# Patient Record
Sex: FEMALE | Race: WHITE | NOT HISPANIC OR LATINO | Employment: OTHER | URBAN - METROPOLITAN AREA
[De-identification: names, ages, dates, MRNs, and addresses within clinical notes are randomized per-mention and may not be internally consistent; named-entity substitution may affect disease eponyms.]

---

## 2017-11-13 ENCOUNTER — CONVERSION ENCOUNTER (OUTPATIENT)
Dept: GENERAL RADIOLOGY | Facility: HOSPITAL | Age: 67
End: 2017-11-13

## 2018-11-15 ENCOUNTER — CONVERSION ENCOUNTER (OUTPATIENT)
Dept: GENERAL RADIOLOGY | Facility: HOSPITAL | Age: 68
End: 2018-11-15

## 2018-12-05 ENCOUNTER — OFFICE VISIT CONVERTED (OUTPATIENT)
Dept: SURGERY | Facility: CLINIC | Age: 68
End: 2018-12-05
Attending: NURSE PRACTITIONER

## 2019-03-13 ENCOUNTER — HOSPITAL ENCOUNTER (OUTPATIENT)
Dept: OTHER | Facility: HOSPITAL | Age: 69
Discharge: HOME | End: 2019-03-13
Attending: NURSE PRACTITIONER

## 2019-03-13 LAB
ALBUMIN SERPL-MCNC: 4.3 G/DL (ref 3.5–5)
ALBUMIN/GLOB SERPL: 1.2 {RATIO} (ref 1.4–2.6)
ALP SERPL-CCNC: 75 U/L (ref 43–160)
ALT SERPL-CCNC: 15 U/L (ref 10–40)
AMYLASE SERPL-CCNC: 284 U/L (ref 30–110)
ANION GAP SERPL CALC-SCNC: 16 MMOL/L (ref 8–19)
AST SERPL-CCNC: 19 U/L (ref 15–50)
BASOPHILS # BLD AUTO: 0.02 10*3/UL (ref 0–0.2)
BASOPHILS NFR BLD AUTO: 0.3 % (ref 0–3)
BILIRUB SERPL-MCNC: 0.36 MG/DL (ref 0.2–1.3)
BUN SERPL-MCNC: 17 MG/DL (ref 5–25)
BUN/CREAT SERPL: 17 {RATIO} (ref 6–20)
CALCIUM SERPL-MCNC: 10.6 MG/DL (ref 8.7–10.4)
CHLORIDE SERPL-SCNC: 99 MMOL/L (ref 99–111)
CONV ABS IMM GRAN: 0.02 10*3/UL (ref 0–0.2)
CONV CO2: 27 MMOL/L (ref 22–32)
CONV IMMATURE GRAN: 0.3 % (ref 0–1.8)
CONV TOTAL PROTEIN: 7.9 G/DL (ref 6.3–8.2)
CREAT UR-MCNC: 1.01 MG/DL (ref 0.5–0.9)
DEPRECATED RDW RBC AUTO: 45.7 FL (ref 36.4–46.3)
EOSINOPHIL # BLD AUTO: 0.28 10*3/UL (ref 0–0.7)
EOSINOPHIL # BLD AUTO: 3.9 % (ref 0–7)
ERYTHROCYTE [DISTWIDTH] IN BLOOD BY AUTOMATED COUNT: 13 % (ref 11.7–14.4)
GFR SERPLBLD BASED ON 1.73 SQ M-ARVRAT: 58 ML/MIN/{1.73_M2}
GLOBULIN UR ELPH-MCNC: 3.6 G/DL (ref 2–3.5)
GLUCOSE SERPL-MCNC: 101 MG/DL (ref 65–99)
HBA1C MFR BLD: 13.5 G/DL (ref 12–16)
HCT VFR BLD AUTO: 39.9 % (ref 37–47)
LIPASE SERPL-CCNC: 16 U/L (ref 5–51)
LYMPHOCYTES # BLD AUTO: 2.97 10*3/UL (ref 1–5)
MCH RBC QN AUTO: 32.5 PG (ref 27–31)
MCHC RBC AUTO-ENTMCNC: 33.8 G/DL (ref 33–37)
MCV RBC AUTO: 95.9 FL (ref 81–99)
MONOCYTES # BLD AUTO: 0.95 10*3/UL (ref 0.2–1.2)
MONOCYTES NFR BLD AUTO: 13.1 % (ref 3–10)
NEUTROPHILS # BLD AUTO: 3.01 10*3/UL (ref 2–8)
NEUTROPHILS NFR BLD AUTO: 41.4 % (ref 30–85)
NRBC CBCN: 0 % (ref 0–0.7)
OSMOLALITY SERPL CALC.SUM OF ELEC: 288 MOSM/KG (ref 273–304)
PLATELET # BLD AUTO: 185 10*3/UL (ref 130–400)
PMV BLD AUTO: 12.1 FL (ref 9.4–12.3)
POTASSIUM SERPL-SCNC: 3.8 MMOL/L (ref 3.5–5.3)
RBC # BLD AUTO: 4.16 10*6/UL (ref 4.2–5.4)
SODIUM SERPL-SCNC: 138 MMOL/L (ref 135–147)
T4 FREE SERPL-MCNC: 1.2 NG/DL (ref 0.9–1.8)
TSH SERPL-ACNC: 1.25 M[IU]/L (ref 0.27–4.2)
VARIANT LYMPHS NFR BLD MANUAL: 41 % (ref 20–45)
WBC # BLD AUTO: 7.25 10*3/UL (ref 4.8–10.8)

## 2019-03-22 ENCOUNTER — HOSPITAL ENCOUNTER (OUTPATIENT)
Dept: GENERAL RADIOLOGY | Facility: HOSPITAL | Age: 69
Discharge: HOME | End: 2019-03-22
Attending: NURSE PRACTITIONER

## 2019-03-25 ENCOUNTER — HOSPITAL ENCOUNTER (OUTPATIENT)
Dept: OTHER | Facility: HOSPITAL | Age: 69
Discharge: HOME | End: 2019-03-25
Attending: NURSE PRACTITIONER

## 2019-03-25 LAB
CHOLEST SERPL-MCNC: 206 MG/DL (ref 107–200)
CHOLEST/HDLC SERPL: 3.4 {RATIO} (ref 3–6)
HDLC SERPL-MCNC: 60 MG/DL (ref 40–60)
LDLC SERPL CALC-MCNC: 115 MG/DL (ref 70–100)
TRIGL SERPL-MCNC: 156 MG/DL (ref 40–150)
VLDLC SERPL-MCNC: 31 MG/DL (ref 5–37)

## 2019-03-28 ENCOUNTER — HOSPITAL ENCOUNTER (OUTPATIENT)
Dept: OTHER | Facility: HOSPITAL | Age: 69
Discharge: HOME | End: 2019-03-28
Attending: NURSE PRACTITIONER

## 2019-03-28 LAB
AMYLASE SERPL-CCNC: 221 U/L (ref 30–110)
PTH-INTACT SERPL-MCNC: 49.4 PG/ML (ref 11.1–79.5)

## 2019-03-29 LAB — CONV CALCIUM IONIZED: 5.9 MG/DL (ref 4.5–5.6)

## 2019-04-08 ENCOUNTER — HOSPITAL ENCOUNTER (OUTPATIENT)
Dept: GENERAL RADIOLOGY | Facility: HOSPITAL | Age: 69
Discharge: HOME | End: 2019-04-08
Attending: NURSE PRACTITIONER

## 2019-04-19 ENCOUNTER — HOSPITAL ENCOUNTER (OUTPATIENT)
Dept: OTHER | Facility: HOSPITAL | Age: 69
Discharge: HOME | End: 2019-04-19
Attending: NURSE PRACTITIONER

## 2019-04-19 LAB
AMYLASE SERPL-CCNC: 473 U/L (ref 30–110)
LIPASE SERPL-CCNC: 21 U/L (ref 5–51)

## 2019-04-20 LAB — CONV CALCIUM IONIZED: 5.6 MG/DL (ref 4.5–5.6)

## 2019-05-14 ENCOUNTER — OFFICE VISIT CONVERTED (OUTPATIENT)
Dept: GASTROENTEROLOGY | Facility: CLINIC | Age: 69
End: 2019-05-14
Attending: NURSE PRACTITIONER

## 2019-05-14 ENCOUNTER — CONVERSION ENCOUNTER (OUTPATIENT)
Dept: GASTROENTEROLOGY | Facility: CLINIC | Age: 69
End: 2019-05-14

## 2019-06-06 ENCOUNTER — HOSPITAL ENCOUNTER (OUTPATIENT)
Dept: GASTROENTEROLOGY | Facility: HOSPITAL | Age: 69
Setting detail: HOSPITAL OUTPATIENT SURGERY
Discharge: HOME | End: 2019-06-06
Attending: INTERNAL MEDICINE

## 2019-08-01 ENCOUNTER — OFFICE VISIT CONVERTED (OUTPATIENT)
Dept: GASTROENTEROLOGY | Facility: CLINIC | Age: 69
End: 2019-08-01
Attending: NURSE PRACTITIONER

## 2019-08-30 ENCOUNTER — HOSPITAL ENCOUNTER (OUTPATIENT)
Dept: GASTROENTEROLOGY | Facility: HOSPITAL | Age: 69
Discharge: HOME | End: 2019-08-30
Attending: NURSE PRACTITIONER

## 2019-10-02 ENCOUNTER — HOSPITAL ENCOUNTER (OUTPATIENT)
Dept: OTHER | Facility: HOSPITAL | Age: 69
Discharge: HOME | End: 2019-10-02
Attending: NURSE PRACTITIONER

## 2019-10-02 LAB
ALBUMIN SERPL-MCNC: 4.4 G/DL (ref 3.5–5)
ALBUMIN/GLOB SERPL: 1.2 {RATIO} (ref 1.4–2.6)
ALP SERPL-CCNC: 89 U/L (ref 43–160)
ALT SERPL-CCNC: 24 U/L (ref 10–40)
ANION GAP SERPL CALC-SCNC: 19 MMOL/L (ref 8–19)
AST SERPL-CCNC: 26 U/L (ref 15–50)
BILIRUB SERPL-MCNC: 0.35 MG/DL (ref 0.2–1.3)
BUN SERPL-MCNC: 19 MG/DL (ref 5–25)
BUN/CREAT SERPL: 19 {RATIO} (ref 6–20)
CALCIUM SERPL-MCNC: 10.6 MG/DL (ref 8.7–10.4)
CHLORIDE SERPL-SCNC: 97 MMOL/L (ref 99–111)
CHOLEST SERPL-MCNC: 247 MG/DL (ref 107–200)
CHOLEST/HDLC SERPL: 4.1 {RATIO} (ref 3–6)
CONV CO2: 25 MMOL/L (ref 22–32)
CONV TOTAL PROTEIN: 8 G/DL (ref 6.3–8.2)
CREAT UR-MCNC: 1.02 MG/DL (ref 0.5–0.9)
GFR SERPLBLD BASED ON 1.73 SQ M-ARVRAT: 57 ML/MIN/{1.73_M2}
GLOBULIN UR ELPH-MCNC: 3.6 G/DL (ref 2–3.5)
GLUCOSE SERPL-MCNC: 109 MG/DL (ref 65–99)
HDLC SERPL-MCNC: 60 MG/DL (ref 40–60)
LDLC SERPL CALC-MCNC: 149 MG/DL (ref 70–100)
OSMOLALITY SERPL CALC.SUM OF ELEC: 287 MOSM/KG (ref 273–304)
POTASSIUM SERPL-SCNC: 3.8 MMOL/L (ref 3.5–5.3)
SODIUM SERPL-SCNC: 137 MMOL/L (ref 135–147)
T4 FREE SERPL-MCNC: 1.3 NG/DL (ref 0.9–1.8)
TRIGL SERPL-MCNC: 191 MG/DL (ref 40–150)
TSH SERPL-ACNC: 1.42 M[IU]/L (ref 0.27–4.2)
VLDLC SERPL-MCNC: 38 MG/DL (ref 5–37)

## 2019-10-22 ENCOUNTER — HOSPITAL ENCOUNTER (OUTPATIENT)
Dept: OTHER | Facility: HOSPITAL | Age: 69
Discharge: HOME | End: 2019-10-22
Attending: NURSE PRACTITIONER

## 2019-10-22 LAB
EST. AVERAGE GLUCOSE BLD GHB EST-MCNC: 120 MG/DL
HBA1C MFR BLD: 5.8 % (ref 3.5–5.7)

## 2019-11-14 ENCOUNTER — HOSPITAL ENCOUNTER (OUTPATIENT)
Dept: GENERAL RADIOLOGY | Facility: HOSPITAL | Age: 69
Discharge: HOME | End: 2019-11-14
Attending: PHYSICIAN ASSISTANT

## 2019-11-14 LAB
CREAT BLD-MCNC: 0.9 MG/DL (ref 0.6–1.4)
GFR SERPLBLD BASED ON 1.73 SQ M-ARVRAT: >60 ML/MIN/{1.73_M2}

## 2019-12-31 ENCOUNTER — OFFICE VISIT CONVERTED (OUTPATIENT)
Dept: NEUROSURGERY | Facility: CLINIC | Age: 69
End: 2019-12-31
Attending: PHYSICIAN ASSISTANT

## 2019-12-31 ENCOUNTER — CONVERSION ENCOUNTER (OUTPATIENT)
Dept: NEUROLOGY | Facility: CLINIC | Age: 69
End: 2019-12-31

## 2020-01-08 ENCOUNTER — HOSPITAL ENCOUNTER (OUTPATIENT)
Dept: GENERAL RADIOLOGY | Facility: HOSPITAL | Age: 70
Discharge: HOME | End: 2020-01-08
Attending: PHYSICIAN ASSISTANT

## 2020-01-15 ENCOUNTER — OFFICE VISIT CONVERTED (OUTPATIENT)
Dept: NEUROSURGERY | Facility: CLINIC | Age: 70
End: 2020-01-15
Attending: PHYSICIAN ASSISTANT

## 2020-08-31 ENCOUNTER — CONVERSION ENCOUNTER (OUTPATIENT)
Dept: PODIATRY | Facility: CLINIC | Age: 70
End: 2020-08-31

## 2020-08-31 ENCOUNTER — OFFICE VISIT CONVERTED (OUTPATIENT)
Dept: PODIATRY | Facility: CLINIC | Age: 70
End: 2020-08-31
Attending: PODIATRIST

## 2020-09-09 ENCOUNTER — PROCEDURE VISIT CONVERTED (OUTPATIENT)
Dept: PODIATRY | Facility: CLINIC | Age: 70
End: 2020-09-09
Attending: PODIATRIST

## 2020-09-09 ENCOUNTER — CONVERSION ENCOUNTER (OUTPATIENT)
Dept: PODIATRY | Facility: CLINIC | Age: 70
End: 2020-09-09

## 2020-09-23 ENCOUNTER — OFFICE VISIT CONVERTED (OUTPATIENT)
Dept: PODIATRY | Facility: CLINIC | Age: 70
End: 2020-09-23
Attending: PODIATRIST

## 2021-04-01 ENCOUNTER — CONVERSION ENCOUNTER (OUTPATIENT)
Dept: FAMILY MEDICINE CLINIC | Facility: CLINIC | Age: 71
End: 2021-04-01

## 2021-04-01 ENCOUNTER — OFFICE VISIT CONVERTED (OUTPATIENT)
Dept: FAMILY MEDICINE CLINIC | Facility: CLINIC | Age: 71
End: 2021-04-01
Attending: FAMILY MEDICINE

## 2021-04-06 ENCOUNTER — HOSPITAL ENCOUNTER (OUTPATIENT)
Dept: FAMILY MEDICINE CLINIC | Facility: CLINIC | Age: 71
Discharge: HOME | End: 2021-04-06
Attending: FAMILY MEDICINE

## 2021-05-14 VITALS
HEIGHT: 58 IN | HEART RATE: 67 BPM | WEIGHT: 131 LBS | OXYGEN SATURATION: 99 % | TEMPERATURE: 97.1 F | SYSTOLIC BLOOD PRESSURE: 153 MMHG | DIASTOLIC BLOOD PRESSURE: 70 MMHG | BODY MASS INDEX: 27.5 KG/M2

## 2021-05-14 VITALS
OXYGEN SATURATION: 97 % | TEMPERATURE: 96.7 F | HEIGHT: 58 IN | SYSTOLIC BLOOD PRESSURE: 145 MMHG | BODY MASS INDEX: 27.71 KG/M2 | HEART RATE: 71 BPM | WEIGHT: 132 LBS | DIASTOLIC BLOOD PRESSURE: 111 MMHG

## 2021-05-14 VITALS
TEMPERATURE: 97.5 F | OXYGEN SATURATION: 100 % | HEIGHT: 58 IN | HEART RATE: 66 BPM | SYSTOLIC BLOOD PRESSURE: 162 MMHG | DIASTOLIC BLOOD PRESSURE: 75 MMHG | WEIGHT: 137 LBS | BODY MASS INDEX: 28.76 KG/M2

## 2021-05-14 VITALS
WEIGHT: 132 LBS | SYSTOLIC BLOOD PRESSURE: 166 MMHG | OXYGEN SATURATION: 98 % | DIASTOLIC BLOOD PRESSURE: 129 MMHG | HEART RATE: 76 BPM | TEMPERATURE: 97.5 F | BODY MASS INDEX: 27.71 KG/M2 | HEIGHT: 58 IN

## 2021-05-14 VITALS — DIASTOLIC BLOOD PRESSURE: 80 MMHG | SYSTOLIC BLOOD PRESSURE: 197 MMHG

## 2021-05-15 VITALS
SYSTOLIC BLOOD PRESSURE: 116 MMHG | HEIGHT: 55 IN | BODY MASS INDEX: 31.13 KG/M2 | WEIGHT: 134.5 LBS | DIASTOLIC BLOOD PRESSURE: 78 MMHG

## 2021-05-15 VITALS
DIASTOLIC BLOOD PRESSURE: 64 MMHG | BODY MASS INDEX: 28.94 KG/M2 | HEIGHT: 57 IN | SYSTOLIC BLOOD PRESSURE: 130 MMHG | WEIGHT: 134.12 LBS

## 2021-05-15 VITALS
HEIGHT: 58 IN | SYSTOLIC BLOOD PRESSURE: 150 MMHG | WEIGHT: 131.44 LBS | BODY MASS INDEX: 27.59 KG/M2 | DIASTOLIC BLOOD PRESSURE: 68 MMHG

## 2021-05-15 VITALS
BODY MASS INDEX: 28.15 KG/M2 | WEIGHT: 134.12 LBS | HEIGHT: 58 IN | DIASTOLIC BLOOD PRESSURE: 68 MMHG | SYSTOLIC BLOOD PRESSURE: 143 MMHG

## 2021-05-16 VITALS — BODY MASS INDEX: 28.39 KG/M2 | WEIGHT: 135.25 LBS | HEIGHT: 58 IN | RESPIRATION RATE: 12 BRPM

## 2021-06-08 ENCOUNTER — OFFICE VISIT (OUTPATIENT)
Dept: FAMILY MEDICINE CLINIC | Facility: CLINIC | Age: 71
End: 2021-06-08

## 2021-06-08 ENCOUNTER — HOSPITAL ENCOUNTER (OUTPATIENT)
Dept: GENERAL RADIOLOGY | Facility: HOSPITAL | Age: 71
Discharge: HOME | End: 2021-06-08
Admitting: FAMILY MEDICINE

## 2021-06-08 ENCOUNTER — TRANSCRIBE ORDERS (OUTPATIENT)
Dept: FAMILY MEDICINE CLINIC | Facility: CLINIC | Age: 71
End: 2021-06-08

## 2021-06-08 VITALS
HEART RATE: 71 BPM | BODY MASS INDEX: 28.63 KG/M2 | DIASTOLIC BLOOD PRESSURE: 70 MMHG | WEIGHT: 136.4 LBS | SYSTOLIC BLOOD PRESSURE: 158 MMHG | TEMPERATURE: 97.4 F | HEIGHT: 58 IN | OXYGEN SATURATION: 96 %

## 2021-06-08 DIAGNOSIS — M25.532 WRIST PAIN, ACUTE, LEFT: ICD-10-CM

## 2021-06-08 DIAGNOSIS — I10 BENIGN ESSENTIAL HYPERTENSION: Primary | Chronic | ICD-10-CM

## 2021-06-08 DIAGNOSIS — M25.532 WRIST PAIN, ACUTE, LEFT: Chronic | ICD-10-CM

## 2021-06-08 DIAGNOSIS — M25.532 WRIST PAIN, ACUTE, LEFT: Primary | ICD-10-CM

## 2021-06-08 PROBLEM — G89.29 CHRONIC BILATERAL LOW BACK PAIN WITH BILATERAL SCIATICA: Status: ACTIVE | Noted: 2020-04-01

## 2021-06-08 PROBLEM — L60.0 INGROWN TOENAIL: Status: ACTIVE | Noted: 2021-06-08

## 2021-06-08 PROBLEM — K21.9 GASTROESOPHAGEAL REFLUX DISEASE: Status: ACTIVE | Noted: 2020-04-01

## 2021-06-08 PROBLEM — L30.9 DERMATITIS: Status: ACTIVE | Noted: 2020-04-01

## 2021-06-08 PROBLEM — E78.5 HYPERLIPIDEMIA: Status: ACTIVE | Noted: 2020-04-01

## 2021-06-08 PROBLEM — K59.00 CONSTIPATION: Status: ACTIVE | Noted: 2020-04-01

## 2021-06-08 PROBLEM — K76.0 FATTY LIVER DISEASE, NONALCOHOLIC: Status: ACTIVE | Noted: 2021-06-08

## 2021-06-08 PROBLEM — K64.9 HEMORRHOID: Status: ACTIVE | Noted: 2021-06-08

## 2021-06-08 PROBLEM — M48.02 SPINAL STENOSIS OF CERVICAL REGION: Status: ACTIVE | Noted: 2021-06-08

## 2021-06-08 PROBLEM — R25.2 MUSCLE CRAMPS: Status: ACTIVE | Noted: 2021-06-08

## 2021-06-08 PROBLEM — E03.9 HYPOTHYROIDISM: Status: ACTIVE | Noted: 2020-04-01

## 2021-06-08 PROBLEM — M54.41 CHRONIC BILATERAL LOW BACK PAIN WITH BILATERAL SCIATICA: Status: ACTIVE | Noted: 2020-04-01

## 2021-06-08 PROBLEM — M54.42 CHRONIC BILATERAL LOW BACK PAIN WITH BILATERAL SCIATICA: Status: ACTIVE | Noted: 2020-04-01

## 2021-06-08 PROCEDURE — 73110 X-RAY EXAM OF WRIST: CPT

## 2021-06-08 PROCEDURE — 99214 OFFICE O/P EST MOD 30 MIN: CPT | Performed by: FAMILY MEDICINE

## 2021-06-08 RX ORDER — LEVOTHYROXINE SODIUM 0.07 MG/1
1 TABLET ORAL DAILY
COMMUNITY
Start: 2021-04-05 | End: 2021-10-12 | Stop reason: SDUPTHER

## 2021-06-08 RX ORDER — ASPIRIN 81 MG/1
1 TABLET ORAL DAILY
COMMUNITY
End: 2022-04-18

## 2021-06-08 RX ORDER — HYDROCHLOROTHIAZIDE 25 MG/1
1 TABLET ORAL DAILY
COMMUNITY
Start: 2021-04-05 | End: 2021-10-12 | Stop reason: SDUPTHER

## 2021-06-08 RX ORDER — LUBIPROSTONE 24 UG/1
24 CAPSULE ORAL AS NEEDED
Status: ON HOLD | COMMUNITY
End: 2021-10-07

## 2021-06-08 RX ORDER — OMEGA-3/DHA/EPA/FISH OIL 300-1000MG
1 CAPSULE ORAL DAILY
COMMUNITY

## 2021-06-08 RX ORDER — THIAMINE HCL 100 MG
500 TABLET ORAL DAILY
COMMUNITY

## 2021-06-08 RX ORDER — ATENOLOL 25 MG/1
1 TABLET ORAL 2 TIMES DAILY
COMMUNITY
Start: 2021-05-22 | End: 2021-06-08 | Stop reason: DRUGHIGH

## 2021-06-08 RX ORDER — OMEPRAZOLE 20 MG/1
1 CAPSULE, DELAYED RELEASE ORAL DAILY PRN
COMMUNITY
End: 2021-10-12 | Stop reason: SDUPTHER

## 2021-06-08 RX ORDER — DOCUSATE SODIUM 100 MG/1
200 CAPSULE, LIQUID FILLED ORAL DAILY
COMMUNITY

## 2021-06-08 RX ORDER — ATENOLOL 50 MG/1
50 TABLET ORAL 2 TIMES DAILY
Qty: 180 TABLET | Refills: 0 | Status: SHIPPED | OUTPATIENT
Start: 2021-06-08 | End: 2021-09-13

## 2021-06-08 RX ORDER — DIPHENOXYLATE HYDROCHLORIDE AND ATROPINE SULFATE 2.5; .025 MG/1; MG/1
1 TABLET ORAL DAILY
COMMUNITY

## 2021-06-08 RX ORDER — CLOBETASOL PROPIONATE 0.5 MG/G
1 CREAM TOPICAL
COMMUNITY
End: 2022-10-03 | Stop reason: SDUPTHER

## 2021-06-08 RX ORDER — LATANOPROST 50 UG/ML
1 SOLUTION/ DROPS OPHTHALMIC NIGHTLY
COMMUNITY

## 2021-06-08 RX ORDER — POLYETHYLENE GLYCOL 3350 17 G/17G
17 POWDER, FOR SOLUTION ORAL DAILY
COMMUNITY

## 2021-06-09 ENCOUNTER — TELEPHONE (OUTPATIENT)
Dept: FAMILY MEDICINE CLINIC | Facility: CLINIC | Age: 71
End: 2021-06-09

## 2021-06-09 DIAGNOSIS — M19.032 OSTEOARTHRITIS OF LEFT WRIST, UNSPECIFIED OSTEOARTHRITIS TYPE: Primary | ICD-10-CM

## 2021-07-08 ENCOUNTER — OFFICE VISIT (OUTPATIENT)
Dept: ORTHOPEDIC SURGERY | Facility: CLINIC | Age: 71
End: 2021-07-08

## 2021-07-08 VITALS — HEART RATE: 71 BPM | OXYGEN SATURATION: 96 % | WEIGHT: 141.2 LBS | BODY MASS INDEX: 29.64 KG/M2 | HEIGHT: 58 IN

## 2021-07-08 DIAGNOSIS — M79.642 LEFT HAND PAIN: ICD-10-CM

## 2021-07-08 DIAGNOSIS — M65.4 DE QUERVAIN'S TENOSYNOVITIS, LEFT: Primary | ICD-10-CM

## 2021-07-08 PROCEDURE — 99203 OFFICE O/P NEW LOW 30 MIN: CPT | Performed by: ORTHOPAEDIC SURGERY

## 2021-07-08 RX ORDER — DICLOFENAC SODIUM 75 MG/1
75 TABLET, DELAYED RELEASE ORAL 2 TIMES DAILY
Qty: 60 TABLET | Refills: 1 | Status: ON HOLD | OUTPATIENT
Start: 2021-07-08 | End: 2021-10-07

## 2021-09-10 ENCOUNTER — TELEPHONE (OUTPATIENT)
Dept: FAMILY MEDICINE CLINIC | Facility: CLINIC | Age: 71
End: 2021-09-10

## 2021-09-13 RX ORDER — ATENOLOL 50 MG/1
TABLET ORAL
Qty: 180 TABLET | Refills: 0 | Status: SHIPPED | OUTPATIENT
Start: 2021-09-13 | End: 2021-10-12 | Stop reason: SDUPTHER

## 2021-10-06 ENCOUNTER — APPOINTMENT (OUTPATIENT)
Dept: GENERAL RADIOLOGY | Facility: HOSPITAL | Age: 71
End: 2021-10-06

## 2021-10-06 ENCOUNTER — HOSPITAL ENCOUNTER (OUTPATIENT)
Facility: HOSPITAL | Age: 71
Setting detail: OBSERVATION
Discharge: HOME | End: 2021-10-08
Attending: EMERGENCY MEDICINE | Admitting: INTERNAL MEDICINE

## 2021-10-06 DIAGNOSIS — R07.9 CHEST PAIN, UNSPECIFIED TYPE: Primary | ICD-10-CM

## 2021-10-06 DIAGNOSIS — I20.0 CRESCENDO ANGINA (HCC): ICD-10-CM

## 2021-10-06 LAB
ALBUMIN SERPL-MCNC: 4.1 G/DL (ref 3.5–5.2)
ALBUMIN/GLOB SERPL: 1.3 G/DL
ALP SERPL-CCNC: 76 U/L (ref 39–117)
ALT SERPL W P-5'-P-CCNC: 24 U/L (ref 1–33)
ANION GAP SERPL CALCULATED.3IONS-SCNC: 12.2 MMOL/L (ref 5–15)
AST SERPL-CCNC: 28 U/L (ref 1–32)
BASOPHILS # BLD AUTO: 0.01 10*3/MM3 (ref 0–0.2)
BASOPHILS NFR BLD AUTO: 0.2 % (ref 0–1.5)
BILIRUB SERPL-MCNC: 0.4 MG/DL (ref 0–1.2)
BUN SERPL-MCNC: 13 MG/DL (ref 8–23)
BUN/CREAT SERPL: 12.7 (ref 7–25)
CALCIUM SPEC-SCNC: 9.8 MG/DL (ref 8.6–10.5)
CHLORIDE SERPL-SCNC: 90 MMOL/L (ref 98–107)
CK MB SERPL-CCNC: 1.82 NG/ML
CK SERPL-CCNC: 45 U/L (ref 20–180)
CO2 SERPL-SCNC: 23.8 MMOL/L (ref 22–29)
CREAT SERPL-MCNC: 1.02 MG/DL (ref 0.57–1)
DEPRECATED RDW RBC AUTO: 44.2 FL (ref 37–54)
EOSINOPHIL # BLD AUTO: 0.28 10*3/MM3 (ref 0–0.4)
EOSINOPHIL NFR BLD AUTO: 5.6 % (ref 0.3–6.2)
ERYTHROCYTE [DISTWIDTH] IN BLOOD BY AUTOMATED COUNT: 13 % (ref 12.3–15.4)
GFR SERPL CREATININE-BSD FRML MDRD: 54 ML/MIN/1.73
GLOBULIN UR ELPH-MCNC: 3.2 GM/DL
GLUCOSE SERPL-MCNC: 274 MG/DL (ref 65–99)
HCT VFR BLD AUTO: 36.1 % (ref 34–46.6)
HGB BLD-MCNC: 12.9 G/DL (ref 12–15.9)
HOLD SPECIMEN: NORMAL
HOLD SPECIMEN: NORMAL
IMM GRANULOCYTES # BLD AUTO: 0.03 10*3/MM3 (ref 0–0.05)
IMM GRANULOCYTES NFR BLD AUTO: 0.6 % (ref 0–0.5)
LIPASE SERPL-CCNC: 13 U/L (ref 13–60)
LYMPHOCYTES # BLD AUTO: 1.04 10*3/MM3 (ref 0.7–3.1)
LYMPHOCYTES NFR BLD AUTO: 20.6 % (ref 19.6–45.3)
MAGNESIUM SERPL-MCNC: 1.6 MG/DL (ref 1.6–2.4)
MCH RBC QN AUTO: 33.1 PG (ref 26.6–33)
MCHC RBC AUTO-ENTMCNC: 35.7 G/DL (ref 31.5–35.7)
MCV RBC AUTO: 92.6 FL (ref 79–97)
MONOCYTES # BLD AUTO: 0.9 10*3/MM3 (ref 0.1–0.9)
MONOCYTES NFR BLD AUTO: 17.9 % (ref 5–12)
NEUTROPHILS NFR BLD AUTO: 2.78 10*3/MM3 (ref 1.7–7)
NEUTROPHILS NFR BLD AUTO: 55.1 % (ref 42.7–76)
NRBC BLD AUTO-RTO: 0 /100 WBC (ref 0–0.2)
NT-PROBNP SERPL-MCNC: 857.5 PG/ML (ref 0–900)
PLATELET # BLD AUTO: 166 10*3/MM3 (ref 140–450)
PMV BLD AUTO: 11.3 FL (ref 6–12)
POTASSIUM SERPL-SCNC: 3.4 MMOL/L (ref 3.5–5.2)
PROT SERPL-MCNC: 7.3 G/DL (ref 6–8.5)
RBC # BLD AUTO: 3.9 10*6/MM3 (ref 3.77–5.28)
SODIUM SERPL-SCNC: 126 MMOL/L (ref 136–145)
TROPONIN I SERPL-MCNC: 0.01 NG/ML (ref 0–0.6)
TROPONIN I SERPL-MCNC: 0.02 NG/ML (ref 0–0.6)
WBC # BLD AUTO: 5.04 10*3/MM3 (ref 3.4–10.8)
WHOLE BLOOD HOLD SPECIMEN: NORMAL
WHOLE BLOOD HOLD SPECIMEN: NORMAL

## 2021-10-06 PROCEDURE — 83880 ASSAY OF NATRIURETIC PEPTIDE: CPT | Performed by: EMERGENCY MEDICINE

## 2021-10-06 PROCEDURE — 84484 ASSAY OF TROPONIN QUANT: CPT

## 2021-10-06 PROCEDURE — 71045 X-RAY EXAM CHEST 1 VIEW: CPT

## 2021-10-06 PROCEDURE — 80053 COMPREHEN METABOLIC PANEL: CPT | Performed by: EMERGENCY MEDICINE

## 2021-10-06 PROCEDURE — 93005 ELECTROCARDIOGRAM TRACING: CPT | Performed by: EMERGENCY MEDICINE

## 2021-10-06 PROCEDURE — 93010 ELECTROCARDIOGRAM REPORT: CPT | Performed by: INTERNAL MEDICINE

## 2021-10-06 PROCEDURE — 99285 EMERGENCY DEPT VISIT HI MDM: CPT

## 2021-10-06 PROCEDURE — 83735 ASSAY OF MAGNESIUM: CPT | Performed by: EMERGENCY MEDICINE

## 2021-10-06 PROCEDURE — 85025 COMPLETE CBC W/AUTO DIFF WBC: CPT

## 2021-10-06 PROCEDURE — 93005 ELECTROCARDIOGRAM TRACING: CPT

## 2021-10-06 PROCEDURE — 93005 ELECTROCARDIOGRAM TRACING: CPT | Performed by: GENERAL PRACTICE

## 2021-10-06 PROCEDURE — 84484 ASSAY OF TROPONIN QUANT: CPT | Performed by: GENERAL PRACTICE

## 2021-10-06 PROCEDURE — 82553 CREATINE MB FRACTION: CPT | Performed by: EMERGENCY MEDICINE

## 2021-10-06 PROCEDURE — 82550 ASSAY OF CK (CPK): CPT | Performed by: EMERGENCY MEDICINE

## 2021-10-06 PROCEDURE — 83690 ASSAY OF LIPASE: CPT | Performed by: EMERGENCY MEDICINE

## 2021-10-06 RX ORDER — ASPIRIN 81 MG/1
81 TABLET ORAL DAILY
Status: DISCONTINUED | OUTPATIENT
Start: 2021-10-07 | End: 2021-10-08

## 2021-10-06 RX ORDER — SODIUM CHLORIDE 0.9 % (FLUSH) 0.9 %
10 SYRINGE (ML) INJECTION EVERY 12 HOURS SCHEDULED
Status: DISCONTINUED | OUTPATIENT
Start: 2021-10-06 | End: 2021-10-08 | Stop reason: HOSPADM

## 2021-10-06 RX ORDER — ASPIRIN 81 MG/1
324 TABLET, CHEWABLE ORAL ONCE
Status: COMPLETED | OUTPATIENT
Start: 2021-10-06 | End: 2021-10-06

## 2021-10-06 RX ORDER — METOPROLOL SUCCINATE 25 MG/1
25 TABLET, EXTENDED RELEASE ORAL
Status: DISCONTINUED | OUTPATIENT
Start: 2021-10-07 | End: 2021-10-07

## 2021-10-06 RX ORDER — SODIUM CHLORIDE 0.9 % (FLUSH) 0.9 %
10 SYRINGE (ML) INJECTION AS NEEDED
Status: DISCONTINUED | OUTPATIENT
Start: 2021-10-06 | End: 2021-10-08 | Stop reason: HOSPADM

## 2021-10-06 RX ORDER — HEPARIN SODIUM 5000 [USP'U]/ML
5000 INJECTION, SOLUTION INTRAVENOUS; SUBCUTANEOUS EVERY 8 HOURS SCHEDULED
Status: DISCONTINUED | OUTPATIENT
Start: 2021-10-06 | End: 2021-10-08

## 2021-10-06 RX ORDER — ASPIRIN 81 MG/1
324 TABLET, CHEWABLE ORAL ONCE
Status: COMPLETED | OUTPATIENT
Start: 2021-10-06 | End: 2021-10-07

## 2021-10-06 RX ADMIN — ASPIRIN 324 MG: 81 TABLET, CHEWABLE ORAL at 19:40

## 2021-10-06 RX ADMIN — NITROGLYCERIN 0.5 INCH: 20 OINTMENT TOPICAL at 22:00

## 2021-10-07 ENCOUNTER — APPOINTMENT (OUTPATIENT)
Dept: NUCLEAR MEDICINE | Facility: HOSPITAL | Age: 71
End: 2021-10-07

## 2021-10-07 LAB
BH CV IMMEDIATE POST RECOVERY TECH DATA SYMPTOMS: NORMAL
BH CV IMMEDIATE POST TECH DATA BLOOD PRESSURE: NORMAL MMHG
BH CV IMMEDIATE POST TECH DATA HEART RATE: 92 BPM
BH CV IMMEDIATE POST TECH DATA OXYGEN SATS: 98 %
BH CV REST NUCLEAR ISOTOPE DOSE: 10 MCI
BH CV SIX MINUTE RECOVERY TECH DATA BLOOD PRESSURE: NORMAL
BH CV SIX MINUTE RECOVERY TECH DATA HEART RATE: 75 BPM
BH CV SIX MINUTE RECOVERY TECH DATA OXYGEN SATURATION: 97 %
BH CV STRESS COMMENTS STAGE 1: NORMAL
BH CV STRESS DOSE REGADENOSON STAGE 1: 0.4
BH CV STRESS DURATION MIN STAGE 1: 0
BH CV STRESS DURATION SEC STAGE 1: 10
BH CV STRESS NUCLEAR ISOTOPE DOSE: 37.1 MCI
BH CV STRESS PROTOCOL 1: NORMAL
BH CV STRESS RECOVERY BP: 9 MMHG
BH CV STRESS RECOVERY HR: 75 BPM
BH CV STRESS RECOVERY O2: 97 %
BH CV STRESS STAGE 1: 1
BH CV THREE MINUTE POST TECH DATA BLOOD PRESSURE: NORMAL MMHG
BH CV THREE MINUTE POST TECH DATA HEART RATE: 83 BPM
BH CV THREE MINUTE POST TECH DATA OXYGEN SATURATION: 98 %
HBA1C MFR BLD: 5.7 % (ref 4.8–5.6)
HOLD SPECIMEN: NORMAL
LV EF NUC BP: 77 %
MAGNESIUM SERPL-MCNC: 1.8 MG/DL (ref 1.6–2.4)
MAXIMAL PREDICTED HEART RATE: 151 BPM
PERCENT MAX PREDICTED HR: 60.93 %
QT INTERVAL: 386 MS
QT INTERVAL: 407 MS
QT INTERVAL: 425 MS
QT INTERVAL: 448 MS
STRESS BASELINE BP: NORMAL MMHG
STRESS BASELINE HR: 69 BPM
STRESS O2 SAT REST: 94 %
STRESS PERCENT HR: 72 %
STRESS POST O2 SAT PEAK: 98 %
STRESS POST PEAK BP: NORMAL MMHG
STRESS POST PEAK HR: 92 BPM
STRESS TARGET HR: 128 BPM
TROPONIN T SERPL-MCNC: <0.01 NG/ML (ref 0–0.03)

## 2021-10-07 PROCEDURE — G0378 HOSPITAL OBSERVATION PER HR: HCPCS

## 2021-10-07 PROCEDURE — 93017 CV STRESS TEST TRACING ONLY: CPT

## 2021-10-07 PROCEDURE — 99204 OFFICE O/P NEW MOD 45 MIN: CPT | Performed by: INTERNAL MEDICINE

## 2021-10-07 PROCEDURE — 93018 CV STRESS TEST I&R ONLY: CPT | Performed by: INTERNAL MEDICINE

## 2021-10-07 PROCEDURE — 84484 ASSAY OF TROPONIN QUANT: CPT | Performed by: INTERNAL MEDICINE

## 2021-10-07 PROCEDURE — 78452 HT MUSCLE IMAGE SPECT MULT: CPT | Performed by: INTERNAL MEDICINE

## 2021-10-07 PROCEDURE — 93016 CV STRESS TEST SUPVJ ONLY: CPT | Performed by: INTERNAL MEDICINE

## 2021-10-07 PROCEDURE — 36415 COLL VENOUS BLD VENIPUNCTURE: CPT | Performed by: GENERAL PRACTICE

## 2021-10-07 PROCEDURE — 25010000002 REGADENOSON 0.4 MG/5ML SOLUTION

## 2021-10-07 PROCEDURE — 93005 ELECTROCARDIOGRAM TRACING: CPT | Performed by: GENERAL PRACTICE

## 2021-10-07 PROCEDURE — 99220 PR INITIAL OBSERVATION CARE/DAY 70 MINUTES: CPT | Performed by: GENERAL PRACTICE

## 2021-10-07 PROCEDURE — A9502 TC99M TETROFOSMIN: HCPCS | Performed by: INTERNAL MEDICINE

## 2021-10-07 PROCEDURE — 96374 THER/PROPH/DIAG INJ IV PUSH: CPT

## 2021-10-07 PROCEDURE — 93010 ELECTROCARDIOGRAM REPORT: CPT | Performed by: INTERNAL MEDICINE

## 2021-10-07 PROCEDURE — 83735 ASSAY OF MAGNESIUM: CPT | Performed by: GENERAL PRACTICE

## 2021-10-07 PROCEDURE — 0 TECHNETIUM TETROFOSMIN KIT: Performed by: INTERNAL MEDICINE

## 2021-10-07 PROCEDURE — 96372 THER/PROPH/DIAG INJ SC/IM: CPT

## 2021-10-07 PROCEDURE — 25010000002 MORPHINE PER 10 MG: Performed by: GENERAL PRACTICE

## 2021-10-07 PROCEDURE — 78452 HT MUSCLE IMAGE SPECT MULT: CPT

## 2021-10-07 PROCEDURE — 25010000002 HEPARIN (PORCINE) PER 1000 UNITS: Performed by: GENERAL PRACTICE

## 2021-10-07 PROCEDURE — 83036 HEMOGLOBIN GLYCOSYLATED A1C: CPT | Performed by: GENERAL PRACTICE

## 2021-10-07 RX ORDER — ATENOLOL 50 MG/1
50 TABLET ORAL 2 TIMES DAILY
Status: DISCONTINUED | OUTPATIENT
Start: 2021-10-07 | End: 2021-10-08 | Stop reason: HOSPADM

## 2021-10-07 RX ORDER — HYDROCHLOROTHIAZIDE 25 MG/1
25 TABLET ORAL DAILY
Status: DISCONTINUED | OUTPATIENT
Start: 2021-10-07 | End: 2021-10-08 | Stop reason: HOSPADM

## 2021-10-07 RX ORDER — DOCUSATE SODIUM 100 MG/1
200 CAPSULE, LIQUID FILLED ORAL DAILY
Status: DISCONTINUED | OUTPATIENT
Start: 2021-10-07 | End: 2021-10-08 | Stop reason: HOSPADM

## 2021-10-07 RX ORDER — LEVOTHYROXINE SODIUM 0.07 MG/1
75 TABLET ORAL DAILY
Status: DISCONTINUED | OUTPATIENT
Start: 2021-10-07 | End: 2021-10-08 | Stop reason: HOSPADM

## 2021-10-07 RX ORDER — POLYETHYLENE GLYCOL 3350 17 G/17G
17 POWDER, FOR SOLUTION ORAL DAILY
Status: DISCONTINUED | OUTPATIENT
Start: 2021-10-07 | End: 2021-10-08 | Stop reason: HOSPADM

## 2021-10-07 RX ORDER — MORPHINE SULFATE 2 MG/ML
1 INJECTION, SOLUTION INTRAMUSCULAR; INTRAVENOUS ONCE
Status: COMPLETED | OUTPATIENT
Start: 2021-10-07 | End: 2021-10-07

## 2021-10-07 RX ADMIN — HEPARIN SODIUM 5000 UNITS: 5000 INJECTION, SOLUTION INTRAVENOUS; SUBCUTANEOUS at 14:45

## 2021-10-07 RX ADMIN — TETROFOSMIN 1 DOSE: 1.38 INJECTION, POWDER, LYOPHILIZED, FOR SOLUTION INTRAVENOUS at 10:40

## 2021-10-07 RX ADMIN — METOPROLOL SUCCINATE 25 MG: 25 TABLET, EXTENDED RELEASE ORAL at 11:57

## 2021-10-07 RX ADMIN — HYDROCHLOROTHIAZIDE 25 MG: 25 TABLET ORAL at 14:45

## 2021-10-07 RX ADMIN — SODIUM CHLORIDE, PRESERVATIVE FREE 10 ML: 5 INJECTION INTRAVENOUS at 21:21

## 2021-10-07 RX ADMIN — DOCUSATE SODIUM 200 MG: 100 CAPSULE, LIQUID FILLED ORAL at 14:46

## 2021-10-07 RX ADMIN — LEVOTHYROXINE SODIUM 75 MCG: 75 TABLET ORAL at 14:45

## 2021-10-07 RX ADMIN — HEPARIN SODIUM 5000 UNITS: 5000 INJECTION, SOLUTION INTRAVENOUS; SUBCUTANEOUS at 06:21

## 2021-10-07 RX ADMIN — TETROFOSMIN 1 DOSE: 1.38 INJECTION, POWDER, LYOPHILIZED, FOR SOLUTION INTRAVENOUS at 09:25

## 2021-10-07 RX ADMIN — ASPIRIN 81 MG 324 MG: 81 TABLET ORAL at 01:33

## 2021-10-07 RX ADMIN — POLYETHYLENE GLYCOL 3350 17 G: 17 POWDER, FOR SOLUTION ORAL at 14:45

## 2021-10-07 RX ADMIN — HEPARIN SODIUM 5000 UNITS: 5000 INJECTION, SOLUTION INTRAVENOUS; SUBCUTANEOUS at 21:20

## 2021-10-07 RX ADMIN — ASPIRIN 81 MG: 81 TABLET, COATED ORAL at 11:57

## 2021-10-07 RX ADMIN — HEPARIN SODIUM 5000 UNITS: 5000 INJECTION, SOLUTION INTRAVENOUS; SUBCUTANEOUS at 03:06

## 2021-10-07 RX ADMIN — SODIUM CHLORIDE, PRESERVATIVE FREE 10 ML: 5 INJECTION INTRAVENOUS at 11:58

## 2021-10-07 RX ADMIN — REGADENOSON: 0.08 INJECTION, SOLUTION INTRAVENOUS at 10:40

## 2021-10-07 RX ADMIN — MORPHINE SULFATE 1 MG: 2 INJECTION, SOLUTION INTRAMUSCULAR; INTRAVENOUS at 06:35

## 2021-10-07 RX ADMIN — ATENOLOL 50 MG: 50 TABLET ORAL at 21:20

## 2021-10-07 RX ADMIN — SODIUM CHLORIDE, PRESERVATIVE FREE 10 ML: 5 INJECTION INTRAVENOUS at 03:07

## 2021-10-08 ENCOUNTER — READMISSION MANAGEMENT (OUTPATIENT)
Dept: CALL CENTER | Facility: HOSPITAL | Age: 71
End: 2021-10-08

## 2021-10-08 ENCOUNTER — TELEPHONE (OUTPATIENT)
Dept: CARDIOLOGY | Facility: CLINIC | Age: 71
End: 2021-10-08

## 2021-10-08 VITALS
SYSTOLIC BLOOD PRESSURE: 105 MMHG | BODY MASS INDEX: 29.99 KG/M2 | HEIGHT: 58 IN | WEIGHT: 142.86 LBS | OXYGEN SATURATION: 92 % | DIASTOLIC BLOOD PRESSURE: 81 MMHG | HEART RATE: 65 BPM | RESPIRATION RATE: 18 BRPM | TEMPERATURE: 97.6 F

## 2021-10-08 LAB
ANION GAP SERPL CALCULATED.3IONS-SCNC: 11.4 MMOL/L (ref 5–15)
BASOPHILS # BLD AUTO: 0.02 10*3/MM3 (ref 0–0.2)
BASOPHILS NFR BLD AUTO: 0.3 % (ref 0–1.5)
BUN SERPL-MCNC: 22 MG/DL (ref 8–23)
BUN/CREAT SERPL: 23.4 (ref 7–25)
CALCIUM SPEC-SCNC: 9.6 MG/DL (ref 8.6–10.5)
CHLORIDE SERPL-SCNC: 94 MMOL/L (ref 98–107)
CO2 SERPL-SCNC: 22.6 MMOL/L (ref 22–29)
CREAT SERPL-MCNC: 0.94 MG/DL (ref 0.57–1)
DEPRECATED RDW RBC AUTO: 45.1 FL (ref 37–54)
EOSINOPHIL # BLD AUTO: 0.47 10*3/MM3 (ref 0–0.4)
EOSINOPHIL NFR BLD AUTO: 6.5 % (ref 0.3–6.2)
ERYTHROCYTE [DISTWIDTH] IN BLOOD BY AUTOMATED COUNT: 13.2 % (ref 12.3–15.4)
GFR SERPL CREATININE-BSD FRML MDRD: 59 ML/MIN/1.73
GLUCOSE SERPL-MCNC: 116 MG/DL (ref 65–99)
HCT VFR BLD AUTO: 35.2 % (ref 34–46.6)
HGB BLD-MCNC: 12.2 G/DL (ref 12–15.9)
IMM GRANULOCYTES # BLD AUTO: 0.03 10*3/MM3 (ref 0–0.05)
IMM GRANULOCYTES NFR BLD AUTO: 0.4 % (ref 0–0.5)
LYMPHOCYTES # BLD AUTO: 2.61 10*3/MM3 (ref 0.7–3.1)
LYMPHOCYTES NFR BLD AUTO: 36.3 % (ref 19.6–45.3)
MAGNESIUM SERPL-MCNC: 1.6 MG/DL (ref 1.6–2.4)
MCH RBC QN AUTO: 32.3 PG (ref 26.6–33)
MCHC RBC AUTO-ENTMCNC: 34.7 G/DL (ref 31.5–35.7)
MCV RBC AUTO: 93.1 FL (ref 79–97)
MONOCYTES # BLD AUTO: 1.25 10*3/MM3 (ref 0.1–0.9)
MONOCYTES NFR BLD AUTO: 17.4 % (ref 5–12)
NEUTROPHILS NFR BLD AUTO: 2.81 10*3/MM3 (ref 1.7–7)
NEUTROPHILS NFR BLD AUTO: 39.1 % (ref 42.7–76)
NRBC BLD AUTO-RTO: 0 /100 WBC (ref 0–0.2)
PLATELET # BLD AUTO: 163 10*3/MM3 (ref 140–450)
PMV BLD AUTO: 11 FL (ref 6–12)
POTASSIUM SERPL-SCNC: 3.6 MMOL/L (ref 3.5–5.2)
RBC # BLD AUTO: 3.78 10*6/MM3 (ref 3.77–5.28)
SODIUM SERPL-SCNC: 128 MMOL/L (ref 136–145)
TROPONIN T SERPL-MCNC: <0.01 NG/ML (ref 0–0.03)
WBC # BLD AUTO: 7.19 10*3/MM3 (ref 3.4–10.8)

## 2021-10-08 PROCEDURE — 99217 PR OBSERVATION CARE DISCHARGE MANAGEMENT: CPT | Performed by: INTERNAL MEDICINE

## 2021-10-08 PROCEDURE — 96372 THER/PROPH/DIAG INJ SC/IM: CPT

## 2021-10-08 PROCEDURE — 93005 ELECTROCARDIOGRAM TRACING: CPT | Performed by: GENERAL PRACTICE

## 2021-10-08 PROCEDURE — 25010000002 HEPARIN (PORCINE) PER 1000 UNITS: Performed by: GENERAL PRACTICE

## 2021-10-08 PROCEDURE — 84484 ASSAY OF TROPONIN QUANT: CPT | Performed by: INTERNAL MEDICINE

## 2021-10-08 PROCEDURE — G0378 HOSPITAL OBSERVATION PER HR: HCPCS

## 2021-10-08 PROCEDURE — 83735 ASSAY OF MAGNESIUM: CPT | Performed by: INTERNAL MEDICINE

## 2021-10-08 PROCEDURE — 84484 ASSAY OF TROPONIN QUANT: CPT | Performed by: GENERAL PRACTICE

## 2021-10-08 PROCEDURE — 80048 BASIC METABOLIC PNL TOTAL CA: CPT | Performed by: INTERNAL MEDICINE

## 2021-10-08 PROCEDURE — 93010 ELECTROCARDIOGRAM REPORT: CPT | Performed by: INTERNAL MEDICINE

## 2021-10-08 PROCEDURE — 99214 OFFICE O/P EST MOD 30 MIN: CPT | Performed by: INTERNAL MEDICINE

## 2021-10-08 PROCEDURE — 85025 COMPLETE CBC W/AUTO DIFF WBC: CPT | Performed by: INTERNAL MEDICINE

## 2021-10-08 RX ORDER — POTASSIUM CHLORIDE 750 MG/1
40 CAPSULE, EXTENDED RELEASE ORAL ONCE
Status: COMPLETED | OUTPATIENT
Start: 2021-10-08 | End: 2021-10-08

## 2021-10-08 RX ORDER — CHOLECALCIFEROL (VITAMIN D3) 125 MCG
10 CAPSULE ORAL NIGHTLY PRN
Status: DISCONTINUED | OUTPATIENT
Start: 2021-10-08 | End: 2021-10-08 | Stop reason: HOSPADM

## 2021-10-08 RX ORDER — NITROGLYCERIN 0.4 MG/1
0.4 TABLET SUBLINGUAL
Status: DISCONTINUED | OUTPATIENT
Start: 2021-10-08 | End: 2021-10-08 | Stop reason: HOSPADM

## 2021-10-08 RX ORDER — FLECAINIDE ACETATE 50 MG/1
50 TABLET ORAL EVERY 12 HOURS SCHEDULED
Status: DISCONTINUED | OUTPATIENT
Start: 2021-10-08 | End: 2021-10-08 | Stop reason: HOSPADM

## 2021-10-08 RX ORDER — FLECAINIDE ACETATE 50 MG/1
50 TABLET ORAL EVERY 12 HOURS SCHEDULED
Qty: 60 TABLET | Refills: 0 | Status: SHIPPED | OUTPATIENT
Start: 2021-10-08 | End: 2021-11-01 | Stop reason: SDUPTHER

## 2021-10-08 RX ORDER — NITROGLYCERIN 0.4 MG/1
0.4 TABLET SUBLINGUAL
Qty: 30 TABLET | Refills: 0 | Status: SHIPPED | OUTPATIENT
Start: 2021-10-08

## 2021-10-08 RX ADMIN — SODIUM CHLORIDE, PRESERVATIVE FREE 10 ML: 5 INJECTION INTRAVENOUS at 09:29

## 2021-10-08 RX ADMIN — POLYETHYLENE GLYCOL 3350 17 G: 17 POWDER, FOR SOLUTION ORAL at 09:28

## 2021-10-08 RX ADMIN — POTASSIUM CHLORIDE 40 MEQ: 750 CAPSULE, EXTENDED RELEASE ORAL at 09:27

## 2021-10-08 RX ADMIN — HYDROCHLOROTHIAZIDE 25 MG: 25 TABLET ORAL at 09:28

## 2021-10-08 RX ADMIN — DOCUSATE SODIUM 200 MG: 100 CAPSULE, LIQUID FILLED ORAL at 09:28

## 2021-10-08 RX ADMIN — ATENOLOL 50 MG: 50 TABLET ORAL at 09:28

## 2021-10-08 RX ADMIN — Medication 10 MG: at 01:32

## 2021-10-08 RX ADMIN — LEVOTHYROXINE SODIUM 75 MCG: 75 TABLET ORAL at 09:28

## 2021-10-08 RX ADMIN — APIXABAN 5 MG: 5 TABLET, FILM COATED ORAL at 11:10

## 2021-10-08 RX ADMIN — FLECAINIDE ACETATE TABLET 50 MG: 50 TABLET ORAL at 11:10

## 2021-10-08 RX ADMIN — HEPARIN SODIUM 5000 UNITS: 5000 INJECTION, SOLUTION INTRAVENOUS; SUBCUTANEOUS at 06:12

## 2021-10-08 RX ADMIN — ASPIRIN 81 MG: 81 TABLET, COATED ORAL at 09:27

## 2021-10-11 ENCOUNTER — TRANSITIONAL CARE MANAGEMENT TELEPHONE ENCOUNTER (OUTPATIENT)
Dept: CALL CENTER | Facility: HOSPITAL | Age: 71
End: 2021-10-11

## 2021-10-12 ENCOUNTER — OFFICE VISIT (OUTPATIENT)
Dept: FAMILY MEDICINE CLINIC | Facility: CLINIC | Age: 71
End: 2021-10-12

## 2021-10-12 VITALS
BODY MASS INDEX: 29.81 KG/M2 | HEIGHT: 58 IN | SYSTOLIC BLOOD PRESSURE: 144 MMHG | HEART RATE: 65 BPM | WEIGHT: 142 LBS | OXYGEN SATURATION: 100 % | TEMPERATURE: 97.3 F | DIASTOLIC BLOOD PRESSURE: 82 MMHG

## 2021-10-12 DIAGNOSIS — Z23 NEED FOR INFLUENZA VACCINATION: ICD-10-CM

## 2021-10-12 DIAGNOSIS — I48.0 PAROXYSMAL ATRIAL FIBRILLATION (HCC): ICD-10-CM

## 2021-10-12 DIAGNOSIS — Z23 NEED FOR VACCINATION: Primary | ICD-10-CM

## 2021-10-12 DIAGNOSIS — I25.9 CHEST PAIN DUE TO MYOCARDIAL ISCHEMIA, UNSPECIFIED ISCHEMIC CHEST PAIN TYPE: ICD-10-CM

## 2021-10-12 PROCEDURE — G0008 ADMIN INFLUENZA VIRUS VAC: HCPCS | Performed by: FAMILY MEDICINE

## 2021-10-12 PROCEDURE — 1111F DSCHRG MED/CURRENT MED MERGE: CPT | Performed by: FAMILY MEDICINE

## 2021-10-12 PROCEDURE — 99496 TRANSJ CARE MGMT HIGH F2F 7D: CPT | Performed by: FAMILY MEDICINE

## 2021-10-12 PROCEDURE — 90662 IIV NO PRSV INCREASED AG IM: CPT | Performed by: FAMILY MEDICINE

## 2021-10-12 RX ORDER — HYDROCHLOROTHIAZIDE 25 MG/1
25 TABLET ORAL DAILY
Qty: 90 TABLET | Refills: 3 | Status: SHIPPED | OUTPATIENT
Start: 2021-10-12 | End: 2022-04-18 | Stop reason: SDUPTHER

## 2021-10-12 RX ORDER — ATENOLOL 50 MG/1
50 TABLET ORAL 2 TIMES DAILY
Qty: 180 TABLET | Refills: 3 | Status: SHIPPED | OUTPATIENT
Start: 2021-10-12 | End: 2022-01-10

## 2021-10-12 RX ORDER — OMEPRAZOLE 20 MG/1
20 CAPSULE, DELAYED RELEASE ORAL DAILY
Qty: 90 CAPSULE | Refills: 3 | Status: SHIPPED | OUTPATIENT
Start: 2021-10-12 | End: 2022-01-10

## 2021-10-12 RX ORDER — LEVOTHYROXINE SODIUM 0.07 MG/1
75 TABLET ORAL DAILY
Qty: 90 TABLET | Refills: 3 | Status: SHIPPED | OUTPATIENT
Start: 2021-10-12 | End: 2022-04-18 | Stop reason: SDUPTHER

## 2021-10-15 ENCOUNTER — CLINICAL SUPPORT (OUTPATIENT)
Dept: CARDIOLOGY | Facility: CLINIC | Age: 71
End: 2021-10-15

## 2021-10-15 DIAGNOSIS — I48.0 PAROXYSMAL ATRIAL FIBRILLATION (HCC): Primary | ICD-10-CM

## 2021-10-15 PROBLEM — L30.9 DERMATITIS: Status: RESOLVED | Noted: 2020-04-01 | Resolved: 2021-10-15

## 2021-10-15 PROBLEM — R25.2 MUSCLE CRAMPS: Status: RESOLVED | Noted: 2021-06-08 | Resolved: 2021-10-15

## 2021-10-15 PROBLEM — M25.532 WRIST PAIN, ACUTE, LEFT: Status: RESOLVED | Noted: 2021-06-08 | Resolved: 2021-10-15

## 2021-10-15 PROBLEM — K59.00 CONSTIPATION: Status: RESOLVED | Noted: 2020-04-01 | Resolved: 2021-10-15

## 2021-10-15 PROBLEM — L60.0 INGROWN TOENAIL: Status: RESOLVED | Noted: 2021-06-08 | Resolved: 2021-10-15

## 2021-10-15 PROBLEM — R07.9 CHEST PAIN: Status: RESOLVED | Noted: 2021-10-06 | Resolved: 2021-10-15

## 2021-10-15 PROCEDURE — 93000 ELECTROCARDIOGRAM COMPLETE: CPT | Performed by: NURSE PRACTITIONER

## 2021-10-31 LAB — QT INTERVAL: 406 MS

## 2021-11-01 ENCOUNTER — OFFICE VISIT (OUTPATIENT)
Dept: CARDIOLOGY | Facility: CLINIC | Age: 71
End: 2021-11-01

## 2021-11-01 VITALS
WEIGHT: 143 LBS | BODY MASS INDEX: 30.02 KG/M2 | HEIGHT: 58 IN | HEART RATE: 59 BPM | DIASTOLIC BLOOD PRESSURE: 92 MMHG | SYSTOLIC BLOOD PRESSURE: 150 MMHG

## 2021-11-01 DIAGNOSIS — I10 PRIMARY HYPERTENSION: ICD-10-CM

## 2021-11-01 DIAGNOSIS — I48.0 PAROXYSMAL ATRIAL FIBRILLATION (HCC): Primary | ICD-10-CM

## 2021-11-01 DIAGNOSIS — F51.01 PRIMARY INSOMNIA: ICD-10-CM

## 2021-11-01 PROCEDURE — 99214 OFFICE O/P EST MOD 30 MIN: CPT | Performed by: INTERNAL MEDICINE

## 2021-11-01 RX ORDER — FLECAINIDE ACETATE 50 MG/1
50 TABLET ORAL EVERY 12 HOURS SCHEDULED
Qty: 180 TABLET | Refills: 3 | Status: SHIPPED | OUTPATIENT
Start: 2021-11-01 | End: 2022-07-11

## 2021-11-22 ENCOUNTER — OFFICE VISIT (OUTPATIENT)
Dept: SLEEP MEDICINE | Facility: HOSPITAL | Age: 71
End: 2021-11-22

## 2021-11-22 VITALS
HEIGHT: 58 IN | BODY MASS INDEX: 30.02 KG/M2 | OXYGEN SATURATION: 96 % | SYSTOLIC BLOOD PRESSURE: 185 MMHG | DIASTOLIC BLOOD PRESSURE: 54 MMHG | HEART RATE: 62 BPM | WEIGHT: 143 LBS

## 2021-11-22 DIAGNOSIS — F51.04 PSYCHOPHYSIOLOGICAL INSOMNIA: ICD-10-CM

## 2021-11-22 DIAGNOSIS — I10 ESSENTIAL HYPERTENSION: ICD-10-CM

## 2021-11-22 DIAGNOSIS — G47.30 OBSERVED SLEEP APNEA: Primary | ICD-10-CM

## 2021-11-22 DIAGNOSIS — F51.01 PRIMARY INSOMNIA: ICD-10-CM

## 2021-11-22 DIAGNOSIS — R06.83 SNORING: ICD-10-CM

## 2021-11-22 DIAGNOSIS — I48.0 PAROXYSMAL ATRIAL FIBRILLATION (HCC): ICD-10-CM

## 2021-11-22 DIAGNOSIS — G47.10 HYPERSOMNIA: ICD-10-CM

## 2021-11-22 PROCEDURE — 99204 OFFICE O/P NEW MOD 45 MIN: CPT | Performed by: INTERNAL MEDICINE

## 2021-11-22 PROCEDURE — G0463 HOSPITAL OUTPT CLINIC VISIT: HCPCS

## 2021-11-22 RX ORDER — ZOLPIDEM TARTRATE 5 MG/1
5 TABLET ORAL TAKE AS DIRECTED
Qty: 2 TABLET | Refills: 0 | Status: SHIPPED | OUTPATIENT
Start: 2021-11-22 | End: 2022-04-18

## 2021-12-13 ENCOUNTER — TELEPHONE (OUTPATIENT)
Dept: FAMILY MEDICINE CLINIC | Facility: CLINIC | Age: 71
End: 2021-12-13

## 2021-12-16 ENCOUNTER — HOSPITAL ENCOUNTER (OUTPATIENT)
Dept: SLEEP MEDICINE | Facility: HOSPITAL | Age: 71
Discharge: HOME | End: 2021-12-16
Admitting: INTERNAL MEDICINE

## 2021-12-16 DIAGNOSIS — I48.0 PAROXYSMAL ATRIAL FIBRILLATION (HCC): ICD-10-CM

## 2021-12-16 DIAGNOSIS — I10 ESSENTIAL HYPERTENSION: ICD-10-CM

## 2021-12-16 DIAGNOSIS — G47.10 HYPERSOMNIA: ICD-10-CM

## 2021-12-16 DIAGNOSIS — G47.30 OBSERVED SLEEP APNEA: ICD-10-CM

## 2021-12-16 DIAGNOSIS — R06.83 SNORING: ICD-10-CM

## 2021-12-16 DIAGNOSIS — F51.04 PSYCHOPHYSIOLOGICAL INSOMNIA: ICD-10-CM

## 2021-12-16 PROCEDURE — 95810 POLYSOM 6/> YRS 4/> PARAM: CPT

## 2021-12-16 PROCEDURE — 95810 POLYSOM 6/> YRS 4/> PARAM: CPT | Performed by: INTERNAL MEDICINE

## 2021-12-20 DIAGNOSIS — I10 ESSENTIAL HYPERTENSION: ICD-10-CM

## 2021-12-20 DIAGNOSIS — R06.83 SNORING: ICD-10-CM

## 2021-12-20 DIAGNOSIS — I48.0 PAROXYSMAL ATRIAL FIBRILLATION (HCC): ICD-10-CM

## 2021-12-20 DIAGNOSIS — G47.33 OSA (OBSTRUCTIVE SLEEP APNEA): Primary | ICD-10-CM

## 2021-12-21 ENCOUNTER — TELEPHONE (OUTPATIENT)
Dept: SLEEP MEDICINE | Facility: HOSPITAL | Age: 71
End: 2021-12-21

## 2021-12-30 ENCOUNTER — TELEPHONE (OUTPATIENT)
Dept: CARDIOLOGY | Facility: CLINIC | Age: 71
End: 2021-12-30

## 2022-02-09 ENCOUNTER — TELEPHONE (OUTPATIENT)
Dept: CARDIOLOGY | Facility: CLINIC | Age: 72
End: 2022-02-09

## 2022-02-22 ENCOUNTER — TELEPHONE (OUTPATIENT)
Dept: FAMILY MEDICINE CLINIC | Facility: CLINIC | Age: 72
End: 2022-02-22

## 2022-03-02 ENCOUNTER — OFFICE VISIT (OUTPATIENT)
Dept: FAMILY MEDICINE CLINIC | Facility: CLINIC | Age: 72
End: 2022-03-02

## 2022-03-02 VITALS
DIASTOLIC BLOOD PRESSURE: 73 MMHG | WEIGHT: 140 LBS | HEART RATE: 60 BPM | HEIGHT: 58 IN | TEMPERATURE: 97.9 F | SYSTOLIC BLOOD PRESSURE: 145 MMHG | BODY MASS INDEX: 29.39 KG/M2 | OXYGEN SATURATION: 99 %

## 2022-03-02 DIAGNOSIS — K63.89 MELANOSIS COLI: ICD-10-CM

## 2022-03-02 DIAGNOSIS — K59.01 SLOW TRANSIT CONSTIPATION: ICD-10-CM

## 2022-03-02 DIAGNOSIS — E03.9 HYPOTHYROIDISM, UNSPECIFIED TYPE: ICD-10-CM

## 2022-03-02 DIAGNOSIS — Z13.220 LIPID SCREENING: ICD-10-CM

## 2022-03-02 DIAGNOSIS — R14.0 ABDOMINAL BLOATING: ICD-10-CM

## 2022-03-02 DIAGNOSIS — Z01.89 ENCOUNTER FOR ROUTINE LABORATORY TESTING: ICD-10-CM

## 2022-03-02 DIAGNOSIS — Z00.00 MEDICARE ANNUAL WELLNESS VISIT, SUBSEQUENT: Primary | ICD-10-CM

## 2022-03-02 PROCEDURE — 99213 OFFICE O/P EST LOW 20 MIN: CPT | Performed by: NURSE PRACTITIONER

## 2022-03-02 PROCEDURE — G0439 PPPS, SUBSEQ VISIT: HCPCS | Performed by: NURSE PRACTITIONER

## 2022-03-02 PROCEDURE — 1159F MED LIST DOCD IN RCRD: CPT | Performed by: NURSE PRACTITIONER

## 2022-03-02 RX ORDER — LACTOBACILLUS ACIDOPHILUS 20B CELL
1 CAPSULE ORAL DAILY
Qty: 90 CAPSULE | Refills: 1 | Status: SHIPPED | OUTPATIENT
Start: 2022-03-02 | End: 2022-04-18

## 2022-03-07 ENCOUNTER — LAB (OUTPATIENT)
Dept: FAMILY MEDICINE CLINIC | Facility: CLINIC | Age: 72
End: 2022-03-07

## 2022-03-07 DIAGNOSIS — Z13.220 LIPID SCREENING: ICD-10-CM

## 2022-03-07 DIAGNOSIS — E03.9 HYPOTHYROIDISM, UNSPECIFIED TYPE: ICD-10-CM

## 2022-03-07 LAB
ALBUMIN SERPL-MCNC: 4.3 G/DL (ref 3.5–5.2)
ALBUMIN/GLOB SERPL: 1.4 G/DL
ALP SERPL-CCNC: 75 U/L (ref 39–117)
ALT SERPL W P-5'-P-CCNC: 18 U/L (ref 1–33)
ANION GAP SERPL CALCULATED.3IONS-SCNC: 10.1 MMOL/L (ref 5–15)
AST SERPL-CCNC: 20 U/L (ref 1–32)
BILIRUB SERPL-MCNC: 0.3 MG/DL (ref 0–1.2)
BUN SERPL-MCNC: 12 MG/DL (ref 8–23)
BUN/CREAT SERPL: 13.2 (ref 7–25)
CALCIUM SPEC-SCNC: 10.5 MG/DL (ref 8.6–10.5)
CHLORIDE SERPL-SCNC: 97 MMOL/L (ref 98–107)
CHOLEST SERPL-MCNC: 227 MG/DL (ref 0–200)
CO2 SERPL-SCNC: 28.9 MMOL/L (ref 22–29)
CREAT SERPL-MCNC: 0.91 MG/DL (ref 0.57–1)
DEPRECATED RDW RBC AUTO: 45.2 FL (ref 37–54)
EGFRCR SERPLBLD CKD-EPI 2021: 68.4 ML/MIN/1.73
ERYTHROCYTE [DISTWIDTH] IN BLOOD BY AUTOMATED COUNT: 12.6 % (ref 12.3–15.4)
GLOBULIN UR ELPH-MCNC: 3.1 GM/DL
GLUCOSE SERPL-MCNC: 139 MG/DL (ref 65–99)
HCT VFR BLD AUTO: 40.3 % (ref 34–46.6)
HDLC SERPL-MCNC: 59 MG/DL (ref 40–60)
HGB BLD-MCNC: 13.1 G/DL (ref 12–15.9)
LDLC SERPL CALC-MCNC: 130 MG/DL (ref 0–100)
LDLC/HDLC SERPL: 2.13 {RATIO}
MCH RBC QN AUTO: 31.6 PG (ref 26.6–33)
MCHC RBC AUTO-ENTMCNC: 32.5 G/DL (ref 31.5–35.7)
MCV RBC AUTO: 97.1 FL (ref 79–97)
PLATELET # BLD AUTO: 162 10*3/MM3 (ref 140–450)
PMV BLD AUTO: 11.8 FL (ref 6–12)
POTASSIUM SERPL-SCNC: 4.7 MMOL/L (ref 3.5–5.2)
PROT SERPL-MCNC: 7.4 G/DL (ref 6–8.5)
RBC # BLD AUTO: 4.15 10*6/MM3 (ref 3.77–5.28)
SODIUM SERPL-SCNC: 136 MMOL/L (ref 136–145)
TRIGL SERPL-MCNC: 213 MG/DL (ref 0–150)
TSH SERPL DL<=0.05 MIU/L-ACNC: 0.52 UIU/ML (ref 0.27–4.2)
VLDLC SERPL-MCNC: 38 MG/DL (ref 5–40)
WBC NRBC COR # BLD: 6.72 10*3/MM3 (ref 3.4–10.8)

## 2022-03-07 PROCEDURE — 80053 COMPREHEN METABOLIC PANEL: CPT | Performed by: NURSE PRACTITIONER

## 2022-03-07 PROCEDURE — 36415 COLL VENOUS BLD VENIPUNCTURE: CPT | Performed by: NURSE PRACTITIONER

## 2022-03-07 PROCEDURE — 84443 ASSAY THYROID STIM HORMONE: CPT | Performed by: NURSE PRACTITIONER

## 2022-03-07 PROCEDURE — 85027 COMPLETE CBC AUTOMATED: CPT | Performed by: NURSE PRACTITIONER

## 2022-03-07 PROCEDURE — 80061 LIPID PANEL: CPT | Performed by: NURSE PRACTITIONER

## 2022-04-07 ENCOUNTER — TELEPHONE (OUTPATIENT)
Dept: FAMILY MEDICINE CLINIC | Facility: CLINIC | Age: 72
End: 2022-04-07

## 2022-04-18 ENCOUNTER — OFFICE VISIT (OUTPATIENT)
Dept: FAMILY MEDICINE CLINIC | Facility: CLINIC | Age: 72
End: 2022-04-18

## 2022-04-18 VITALS
HEIGHT: 58 IN | BODY MASS INDEX: 28.97 KG/M2 | OXYGEN SATURATION: 96 % | DIASTOLIC BLOOD PRESSURE: 62 MMHG | HEART RATE: 57 BPM | TEMPERATURE: 98.6 F | WEIGHT: 138 LBS | SYSTOLIC BLOOD PRESSURE: 144 MMHG

## 2022-04-18 DIAGNOSIS — E78.5 HYPERLIPIDEMIA LDL GOAL <100: ICD-10-CM

## 2022-04-18 DIAGNOSIS — E03.9 HYPOTHYROIDISM, UNSPECIFIED TYPE: ICD-10-CM

## 2022-04-18 DIAGNOSIS — Z12.31 ENCOUNTER FOR SCREENING MAMMOGRAM FOR MALIGNANT NEOPLASM OF BREAST: ICD-10-CM

## 2022-04-18 DIAGNOSIS — I10 ESSENTIAL HYPERTENSION: Primary | ICD-10-CM

## 2022-04-18 DIAGNOSIS — Z23 NEED FOR SHINGLES VACCINE: ICD-10-CM

## 2022-04-18 DIAGNOSIS — H69.83 EUSTACHIAN TUBE DYSFUNCTION, BILATERAL: ICD-10-CM

## 2022-04-18 DIAGNOSIS — Z23 NEED FOR TDAP VACCINATION: ICD-10-CM

## 2022-04-18 PROCEDURE — 99214 OFFICE O/P EST MOD 30 MIN: CPT | Performed by: NURSE PRACTITIONER

## 2022-04-18 RX ORDER — LEVOTHYROXINE SODIUM 0.07 MG/1
75 TABLET ORAL DAILY
Qty: 90 TABLET | Refills: 1 | Status: SHIPPED | OUTPATIENT
Start: 2022-04-18 | End: 2022-10-03

## 2022-04-18 RX ORDER — ATENOLOL 50 MG/1
50 TABLET ORAL DAILY
COMMUNITY
Start: 2022-03-04 | End: 2022-04-18 | Stop reason: SDUPTHER

## 2022-04-18 RX ORDER — OMEPRAZOLE 20 MG/1
20 CAPSULE, DELAYED RELEASE ORAL DAILY
COMMUNITY
Start: 2022-03-10 | End: 2022-12-15

## 2022-04-18 RX ORDER — ATENOLOL 50 MG/1
50 TABLET ORAL DAILY
Qty: 90 TABLET | Refills: 1 | Status: SHIPPED | OUTPATIENT
Start: 2022-04-18 | End: 2022-10-03

## 2022-04-18 RX ORDER — HYDROCHLOROTHIAZIDE 25 MG/1
25 TABLET ORAL DAILY
Qty: 90 TABLET | Refills: 1 | Status: SHIPPED | OUTPATIENT
Start: 2022-04-18 | End: 2022-11-17

## 2022-05-09 RX ORDER — APIXABAN 5 MG/1
TABLET, FILM COATED ORAL
Qty: 180 TABLET | Refills: 3 | Status: SHIPPED | OUTPATIENT
Start: 2022-05-09 | End: 2023-01-09

## 2022-05-16 ENCOUNTER — OFFICE VISIT (OUTPATIENT)
Dept: CARDIOLOGY | Facility: CLINIC | Age: 72
End: 2022-05-16

## 2022-05-16 VITALS
HEIGHT: 58 IN | HEART RATE: 62 BPM | BODY MASS INDEX: 28.97 KG/M2 | DIASTOLIC BLOOD PRESSURE: 86 MMHG | WEIGHT: 138 LBS | SYSTOLIC BLOOD PRESSURE: 148 MMHG

## 2022-05-16 DIAGNOSIS — E78.2 HYPERLIPEMIA, MIXED: ICD-10-CM

## 2022-05-16 DIAGNOSIS — I48.0 PAROXYSMAL ATRIAL FIBRILLATION: Primary | ICD-10-CM

## 2022-05-16 DIAGNOSIS — I10 PRIMARY HYPERTENSION: ICD-10-CM

## 2022-05-16 PROCEDURE — 99214 OFFICE O/P EST MOD 30 MIN: CPT | Performed by: INTERNAL MEDICINE

## 2022-05-16 RX ORDER — ROSUVASTATIN CALCIUM 5 MG/1
5 TABLET, COATED ORAL DAILY
Qty: 90 TABLET | Refills: 3 | Status: SHIPPED | OUTPATIENT
Start: 2022-05-16 | End: 2023-02-20

## 2022-06-21 ENCOUNTER — HOSPITAL ENCOUNTER (OUTPATIENT)
Dept: MAMMOGRAPHY | Facility: HOSPITAL | Age: 72
Discharge: HOME | End: 2022-06-21
Admitting: NURSE PRACTITIONER

## 2022-06-21 DIAGNOSIS — Z12.31 ENCOUNTER FOR SCREENING MAMMOGRAM FOR MALIGNANT NEOPLASM OF BREAST: ICD-10-CM

## 2022-06-21 PROCEDURE — 77063 BREAST TOMOSYNTHESIS BI: CPT

## 2022-06-21 PROCEDURE — 77067 SCR MAMMO BI INCL CAD: CPT

## 2022-07-11 RX ORDER — FLECAINIDE ACETATE 50 MG/1
TABLET ORAL
Qty: 180 TABLET | Refills: 3 | Status: SHIPPED | OUTPATIENT
Start: 2022-07-11

## 2022-09-12 RX ORDER — APIXABAN 5 MG/1
TABLET, FILM COATED ORAL
Qty: 60 TABLET | Refills: 3 | OUTPATIENT
Start: 2022-09-12

## 2022-10-03 ENCOUNTER — TELEPHONE (OUTPATIENT)
Dept: FAMILY MEDICINE CLINIC | Facility: CLINIC | Age: 72
End: 2022-10-03

## 2022-10-03 DIAGNOSIS — I10 ESSENTIAL HYPERTENSION: ICD-10-CM

## 2022-10-03 DIAGNOSIS — E03.9 HYPOTHYROIDISM, UNSPECIFIED TYPE: ICD-10-CM

## 2022-10-03 RX ORDER — LEVOTHYROXINE SODIUM 0.07 MG/1
TABLET ORAL
Qty: 90 TABLET | Refills: 1 | Status: SHIPPED | OUTPATIENT
Start: 2022-10-03 | End: 2023-01-09

## 2022-10-03 RX ORDER — ATENOLOL 50 MG/1
TABLET ORAL
Qty: 180 TABLET | Refills: 3 | Status: SHIPPED | OUTPATIENT
Start: 2022-10-03

## 2022-10-04 RX ORDER — CLOBETASOL PROPIONATE 0.5 MG/G
1 CREAM TOPICAL 2 TIMES DAILY PRN
Qty: 45 G | Refills: 0 | Status: SHIPPED | OUTPATIENT
Start: 2022-10-04

## 2022-11-17 DIAGNOSIS — I10 ESSENTIAL HYPERTENSION: ICD-10-CM

## 2022-11-17 RX ORDER — HYDROCHLOROTHIAZIDE 25 MG/1
TABLET ORAL
Qty: 90 TABLET | Refills: 1 | Status: SHIPPED | OUTPATIENT
Start: 2022-11-17

## 2022-12-15 RX ORDER — OMEPRAZOLE 20 MG/1
CAPSULE, DELAYED RELEASE ORAL
Qty: 90 CAPSULE | Refills: 3 | Status: SHIPPED | OUTPATIENT
Start: 2022-12-15

## 2023-01-07 DIAGNOSIS — E03.9 HYPOTHYROIDISM, UNSPECIFIED TYPE: ICD-10-CM

## 2023-01-09 RX ORDER — APIXABAN 5 MG/1
TABLET, FILM COATED ORAL
Qty: 180 TABLET | Refills: 3 | Status: SHIPPED | OUTPATIENT
Start: 2023-01-09 | End: 2023-01-27 | Stop reason: SDUPTHER

## 2023-01-09 RX ORDER — LEVOTHYROXINE SODIUM 0.07 MG/1
TABLET ORAL
Qty: 90 TABLET | Refills: 1 | Status: SHIPPED | OUTPATIENT
Start: 2023-01-09

## 2023-02-20 RX ORDER — ROSUVASTATIN CALCIUM 5 MG/1
TABLET, COATED ORAL
Qty: 90 TABLET | Refills: 3 | Status: SHIPPED | OUTPATIENT
Start: 2023-02-20

## 2023-04-03 ENCOUNTER — TELEPHONE (OUTPATIENT)
Dept: GASTROENTEROLOGY | Facility: CLINIC | Age: 73
End: 2023-04-03
Payer: MEDICARE

## 2023-04-19 ENCOUNTER — OFFICE VISIT (OUTPATIENT)
Dept: FAMILY MEDICINE CLINIC | Facility: CLINIC | Age: 73
End: 2023-04-19
Payer: MEDICARE

## 2023-04-19 VITALS
WEIGHT: 140.6 LBS | SYSTOLIC BLOOD PRESSURE: 172 MMHG | OXYGEN SATURATION: 95 % | TEMPERATURE: 97.5 F | BODY MASS INDEX: 29.39 KG/M2 | HEART RATE: 57 BPM | DIASTOLIC BLOOD PRESSURE: 67 MMHG

## 2023-04-19 DIAGNOSIS — F51.01 PRIMARY INSOMNIA: ICD-10-CM

## 2023-04-19 DIAGNOSIS — Z11.59 NEED FOR HEPATITIS C SCREENING TEST: ICD-10-CM

## 2023-04-19 DIAGNOSIS — Z00.00 MEDICARE ANNUAL WELLNESS VISIT, SUBSEQUENT: Primary | ICD-10-CM

## 2023-04-19 DIAGNOSIS — I10 ESSENTIAL HYPERTENSION: ICD-10-CM

## 2023-04-19 DIAGNOSIS — Z23 NEED FOR VACCINATION: ICD-10-CM

## 2023-04-19 DIAGNOSIS — E78.5 HYPERLIPIDEMIA LDL GOAL <100: ICD-10-CM

## 2023-04-19 DIAGNOSIS — E03.9 HYPOTHYROIDISM, UNSPECIFIED TYPE: ICD-10-CM

## 2023-04-19 LAB
ALBUMIN SERPL-MCNC: 4.4 G/DL (ref 3.5–5.2)
ALBUMIN/GLOB SERPL: 1.3 G/DL
ALP SERPL-CCNC: 86 U/L (ref 39–117)
ALT SERPL W P-5'-P-CCNC: 17 U/L (ref 1–33)
ANION GAP SERPL CALCULATED.3IONS-SCNC: 9 MMOL/L (ref 5–15)
AST SERPL-CCNC: 23 U/L (ref 1–32)
BILIRUB SERPL-MCNC: 0.4 MG/DL (ref 0–1.2)
BUN SERPL-MCNC: 17 MG/DL (ref 8–23)
BUN/CREAT SERPL: 16 (ref 7–25)
CALCIUM SPEC-SCNC: 10.2 MG/DL (ref 8.6–10.5)
CHLORIDE SERPL-SCNC: 100 MMOL/L (ref 98–107)
CHOLEST SERPL-MCNC: 176 MG/DL (ref 0–200)
CO2 SERPL-SCNC: 27 MMOL/L (ref 22–29)
CREAT SERPL-MCNC: 1.06 MG/DL (ref 0.57–1)
DEPRECATED RDW RBC AUTO: 41.4 FL (ref 37–54)
EGFRCR SERPLBLD CKD-EPI 2021: 56.6 ML/MIN/1.73
ERYTHROCYTE [DISTWIDTH] IN BLOOD BY AUTOMATED COUNT: 12 % (ref 12.3–15.4)
GLOBULIN UR ELPH-MCNC: 3.4 GM/DL
GLUCOSE SERPL-MCNC: 108 MG/DL (ref 65–99)
HCT VFR BLD AUTO: 40.2 % (ref 34–46.6)
HCV AB SER DONR QL: NORMAL
HDLC SERPL-MCNC: 65 MG/DL (ref 40–60)
HGB BLD-MCNC: 13.7 G/DL (ref 12–15.9)
LDLC SERPL CALC-MCNC: 90 MG/DL (ref 0–100)
LDLC/HDLC SERPL: 1.33 {RATIO}
MCH RBC QN AUTO: 31.9 PG (ref 26.6–33)
MCHC RBC AUTO-ENTMCNC: 34.1 G/DL (ref 31.5–35.7)
MCV RBC AUTO: 93.7 FL (ref 79–97)
PLATELET # BLD AUTO: 172 10*3/MM3 (ref 140–450)
PMV BLD AUTO: 12.1 FL (ref 6–12)
POTASSIUM SERPL-SCNC: 5.4 MMOL/L (ref 3.5–5.2)
PROT SERPL-MCNC: 7.8 G/DL (ref 6–8.5)
RBC # BLD AUTO: 4.29 10*6/MM3 (ref 3.77–5.28)
SODIUM SERPL-SCNC: 136 MMOL/L (ref 136–145)
TRIGL SERPL-MCNC: 122 MG/DL (ref 0–150)
TSH SERPL DL<=0.05 MIU/L-ACNC: 1.63 UIU/ML (ref 0.27–4.2)
VLDLC SERPL-MCNC: 21 MG/DL (ref 5–40)
WBC NRBC COR # BLD: 7.04 10*3/MM3 (ref 3.4–10.8)

## 2023-04-19 PROCEDURE — 84443 ASSAY THYROID STIM HORMONE: CPT | Performed by: NURSE PRACTITIONER

## 2023-04-19 PROCEDURE — 80061 LIPID PANEL: CPT | Performed by: NURSE PRACTITIONER

## 2023-04-19 PROCEDURE — 85027 COMPLETE CBC AUTOMATED: CPT | Performed by: NURSE PRACTITIONER

## 2023-04-19 PROCEDURE — 80053 COMPREHEN METABOLIC PANEL: CPT | Performed by: NURSE PRACTITIONER

## 2023-04-19 PROCEDURE — 86803 HEPATITIS C AB TEST: CPT | Performed by: NURSE PRACTITIONER

## 2023-04-19 RX ORDER — ATENOLOL 50 MG/1
50 TABLET ORAL 2 TIMES DAILY
Qty: 180 TABLET | Refills: 3 | Status: SHIPPED | OUTPATIENT
Start: 2023-04-19

## 2023-04-19 RX ORDER — HYDROXYZINE HYDROCHLORIDE 25 MG/1
25 TABLET, FILM COATED ORAL NIGHTLY PRN
Qty: 90 TABLET | Refills: 1 | Status: SHIPPED | OUTPATIENT
Start: 2023-04-19

## 2023-04-21 ENCOUNTER — TELEPHONE (OUTPATIENT)
Dept: FAMILY MEDICINE CLINIC | Facility: CLINIC | Age: 73
End: 2023-04-21
Payer: MEDICARE

## 2023-04-21 DIAGNOSIS — R73.01 IMPAIRED FASTING GLUCOSE: Primary | ICD-10-CM

## 2023-04-21 DIAGNOSIS — E87.5 SERUM POTASSIUM ELEVATED: Primary | ICD-10-CM

## 2023-05-03 ENCOUNTER — CLINICAL SUPPORT (OUTPATIENT)
Dept: FAMILY MEDICINE CLINIC | Facility: CLINIC | Age: 73
End: 2023-05-03
Payer: MEDICARE

## 2023-05-03 DIAGNOSIS — R73.01 IMPAIRED FASTING GLUCOSE: ICD-10-CM

## 2023-05-03 DIAGNOSIS — E87.5 SERUM POTASSIUM ELEVATED: ICD-10-CM

## 2023-05-03 LAB
HBA1C MFR BLD: 5.9 % (ref 4.8–5.6)
POTASSIUM SERPL-SCNC: 5 MMOL/L (ref 3.5–5.2)

## 2023-05-03 PROCEDURE — 84132 ASSAY OF SERUM POTASSIUM: CPT | Performed by: NURSE PRACTITIONER

## 2023-05-03 PROCEDURE — 83036 HEMOGLOBIN GLYCOSYLATED A1C: CPT | Performed by: NURSE PRACTITIONER

## 2023-05-17 ENCOUNTER — OFFICE VISIT (OUTPATIENT)
Dept: CARDIOLOGY | Facility: CLINIC | Age: 73
End: 2023-05-17
Payer: MEDICARE

## 2023-05-17 VITALS
HEART RATE: 56 BPM | DIASTOLIC BLOOD PRESSURE: 91 MMHG | SYSTOLIC BLOOD PRESSURE: 168 MMHG | HEIGHT: 58 IN | BODY MASS INDEX: 30.02 KG/M2 | WEIGHT: 143 LBS

## 2023-05-17 DIAGNOSIS — I48.0 PAROXYSMAL ATRIAL FIBRILLATION: Primary | ICD-10-CM

## 2023-05-17 DIAGNOSIS — I10 WHITE COAT SYNDROME WITH HYPERTENSION: ICD-10-CM

## 2023-05-17 DIAGNOSIS — I10 PRIMARY HYPERTENSION: ICD-10-CM

## 2023-05-17 DIAGNOSIS — E78.2 HYPERLIPEMIA, MIXED: ICD-10-CM

## 2023-05-17 PROCEDURE — 99214 OFFICE O/P EST MOD 30 MIN: CPT | Performed by: INTERNAL MEDICINE

## 2023-05-17 PROCEDURE — 1160F RVW MEDS BY RX/DR IN RCRD: CPT | Performed by: INTERNAL MEDICINE

## 2023-05-17 PROCEDURE — 3077F SYST BP >= 140 MM HG: CPT | Performed by: INTERNAL MEDICINE

## 2023-05-17 PROCEDURE — 3080F DIAST BP >= 90 MM HG: CPT | Performed by: INTERNAL MEDICINE

## 2023-05-17 PROCEDURE — 1159F MED LIST DOCD IN RCRD: CPT | Performed by: INTERNAL MEDICINE

## 2023-05-17 RX ORDER — FLECAINIDE ACETATE 50 MG/1
50 TABLET ORAL EVERY 12 HOURS
Qty: 180 TABLET | Refills: 3 | Status: SHIPPED | OUTPATIENT
Start: 2023-05-17

## 2023-08-12 DIAGNOSIS — E03.9 HYPOTHYROIDISM, UNSPECIFIED TYPE: ICD-10-CM

## 2023-08-14 RX ORDER — LEVOTHYROXINE SODIUM 0.07 MG/1
TABLET ORAL
Qty: 90 TABLET | Refills: 1 | Status: SHIPPED | OUTPATIENT
Start: 2023-08-14

## 2023-08-14 RX ORDER — CLOBETASOL PROPIONATE 0.5 MG/G
CREAM TOPICAL
Qty: 45 G | Refills: 0 | Status: SHIPPED | OUTPATIENT
Start: 2023-08-14

## 2024-02-07 DIAGNOSIS — E03.9 HYPOTHYROIDISM, UNSPECIFIED TYPE: ICD-10-CM

## 2024-02-07 RX ORDER — LEVOTHYROXINE SODIUM 0.07 MG/1
TABLET ORAL
Qty: 90 TABLET | Refills: 1 | Status: SHIPPED | OUTPATIENT
Start: 2024-02-07

## 2024-02-07 RX ORDER — ROSUVASTATIN CALCIUM 5 MG/1
5 TABLET, COATED ORAL DAILY
Qty: 90 TABLET | Refills: 0 | Status: SHIPPED | OUTPATIENT
Start: 2024-02-07

## 2024-02-07 RX ORDER — OMEPRAZOLE 20 MG/1
20 CAPSULE, DELAYED RELEASE ORAL DAILY
Qty: 90 CAPSULE | Refills: 3 | Status: SHIPPED | OUTPATIENT
Start: 2024-02-07

## 2024-02-07 RX ORDER — OMEPRAZOLE 20 MG/1
CAPSULE, DELAYED RELEASE ORAL
Qty: 90 CAPSULE | Refills: 3 | Status: SHIPPED | OUTPATIENT
Start: 2024-02-07 | End: 2024-02-07 | Stop reason: SDUPTHER

## 2024-03-04 DIAGNOSIS — I10 ESSENTIAL HYPERTENSION: ICD-10-CM

## 2024-03-04 RX ORDER — ATENOLOL 50 MG/1
50 TABLET ORAL 2 TIMES DAILY
Qty: 180 TABLET | Refills: 3 | Status: SHIPPED | OUTPATIENT
Start: 2024-03-04

## 2024-04-22 ENCOUNTER — OFFICE VISIT (OUTPATIENT)
Dept: FAMILY MEDICINE CLINIC | Facility: CLINIC | Age: 74
End: 2024-04-22
Payer: MEDICARE

## 2024-04-22 VITALS
HEIGHT: 58 IN | SYSTOLIC BLOOD PRESSURE: 162 MMHG | OXYGEN SATURATION: 97 % | TEMPERATURE: 97.2 F | BODY MASS INDEX: 29.89 KG/M2 | WEIGHT: 142.4 LBS | DIASTOLIC BLOOD PRESSURE: 83 MMHG | HEART RATE: 54 BPM

## 2024-04-22 DIAGNOSIS — E03.9 HYPOTHYROIDISM, UNSPECIFIED TYPE: ICD-10-CM

## 2024-04-22 DIAGNOSIS — Z12.31 BREAST CANCER SCREENING BY MAMMOGRAM: ICD-10-CM

## 2024-04-22 DIAGNOSIS — K21.9 GASTROESOPHAGEAL REFLUX DISEASE WITHOUT ESOPHAGITIS: ICD-10-CM

## 2024-04-22 DIAGNOSIS — I10 ESSENTIAL HYPERTENSION: ICD-10-CM

## 2024-04-22 DIAGNOSIS — E78.5 HYPERLIPIDEMIA LDL GOAL <100: ICD-10-CM

## 2024-04-22 DIAGNOSIS — Z00.00 MEDICARE ANNUAL WELLNESS VISIT, SUBSEQUENT: Primary | ICD-10-CM

## 2024-04-22 DIAGNOSIS — I48.0 PAROXYSMAL ATRIAL FIBRILLATION: ICD-10-CM

## 2024-04-22 DIAGNOSIS — Z78.0 POST-MENOPAUSAL: ICD-10-CM

## 2024-04-22 PROCEDURE — 1159F MED LIST DOCD IN RCRD: CPT | Performed by: NURSE PRACTITIONER

## 2024-04-22 PROCEDURE — 3079F DIAST BP 80-89 MM HG: CPT | Performed by: NURSE PRACTITIONER

## 2024-04-22 PROCEDURE — 1160F RVW MEDS BY RX/DR IN RCRD: CPT | Performed by: NURSE PRACTITIONER

## 2024-04-22 PROCEDURE — G0439 PPPS, SUBSEQ VISIT: HCPCS | Performed by: NURSE PRACTITIONER

## 2024-04-22 PROCEDURE — 3077F SYST BP >= 140 MM HG: CPT | Performed by: NURSE PRACTITIONER

## 2024-04-22 RX ORDER — DORZOLAMIDE HCL 20 MG/ML
SOLUTION/ DROPS OPHTHALMIC
COMMUNITY
Start: 2024-03-19

## 2024-04-23 ENCOUNTER — LAB (OUTPATIENT)
Dept: LAB | Facility: HOSPITAL | Age: 74
End: 2024-04-23
Payer: MEDICARE

## 2024-04-23 LAB
DEPRECATED RDW RBC AUTO: 43.2 FL (ref 37–54)
ERYTHROCYTE [DISTWIDTH] IN BLOOD BY AUTOMATED COUNT: 12.3 % (ref 12.3–15.4)
HCT VFR BLD AUTO: 40.9 % (ref 34–46.6)
HGB BLD-MCNC: 13.3 G/DL (ref 12–15.9)
MCH RBC QN AUTO: 31.2 PG (ref 26.6–33)
MCHC RBC AUTO-ENTMCNC: 32.5 G/DL (ref 31.5–35.7)
MCV RBC AUTO: 96 FL (ref 79–97)
PLATELET # BLD AUTO: 155 10*3/MM3 (ref 140–450)
PMV BLD AUTO: 12.9 FL (ref 6–12)
RBC # BLD AUTO: 4.26 10*6/MM3 (ref 3.77–5.28)
WBC NRBC COR # BLD AUTO: 6.3 10*3/MM3 (ref 3.4–10.8)

## 2024-04-23 PROCEDURE — 80061 LIPID PANEL: CPT | Performed by: NURSE PRACTITIONER

## 2024-04-23 PROCEDURE — 80053 COMPREHEN METABOLIC PANEL: CPT | Performed by: NURSE PRACTITIONER

## 2024-04-23 PROCEDURE — 84443 ASSAY THYROID STIM HORMONE: CPT | Performed by: NURSE PRACTITIONER

## 2024-04-23 PROCEDURE — 85027 COMPLETE CBC AUTOMATED: CPT | Performed by: NURSE PRACTITIONER

## 2024-04-23 PROCEDURE — 36415 COLL VENOUS BLD VENIPUNCTURE: CPT | Performed by: NURSE PRACTITIONER

## 2024-04-24 DIAGNOSIS — E03.9 HYPOTHYROIDISM, UNSPECIFIED TYPE: Primary | ICD-10-CM

## 2024-04-24 LAB
ALBUMIN SERPL-MCNC: 3.9 G/DL (ref 3.5–5.2)
ALBUMIN/GLOB SERPL: 1.1 G/DL
ALP SERPL-CCNC: 85 U/L (ref 39–117)
ALT SERPL W P-5'-P-CCNC: 18 U/L (ref 1–33)
ANION GAP SERPL CALCULATED.3IONS-SCNC: 15 MMOL/L (ref 5–15)
AST SERPL-CCNC: 29 U/L (ref 1–32)
BILIRUB SERPL-MCNC: 0.4 MG/DL (ref 0–1.2)
BUN SERPL-MCNC: 13 MG/DL (ref 8–23)
BUN/CREAT SERPL: 13.7 (ref 7–25)
CALCIUM SPEC-SCNC: 9.6 MG/DL (ref 8.6–10.5)
CHLORIDE SERPL-SCNC: 99 MMOL/L (ref 98–107)
CHOLEST SERPL-MCNC: 148 MG/DL (ref 0–200)
CO2 SERPL-SCNC: 15 MMOL/L (ref 22–29)
CREAT SERPL-MCNC: 0.95 MG/DL (ref 0.57–1)
EGFRCR SERPLBLD CKD-EPI 2021: 64.2 ML/MIN/1.73
GLOBULIN UR ELPH-MCNC: 3.4 GM/DL
GLUCOSE SERPL-MCNC: 90 MG/DL (ref 65–99)
HDLC SERPL-MCNC: 60 MG/DL (ref 40–60)
LDLC SERPL CALC-MCNC: 63 MG/DL (ref 0–100)
LDLC/HDLC SERPL: 0.98 {RATIO}
POTASSIUM SERPL-SCNC: 4.9 MMOL/L (ref 3.5–5.2)
PROT SERPL-MCNC: 7.3 G/DL (ref 6–8.5)
SODIUM SERPL-SCNC: 129 MMOL/L (ref 136–145)
TRIGL SERPL-MCNC: 146 MG/DL (ref 0–150)
TSH SERPL DL<=0.05 MIU/L-ACNC: 5.64 UIU/ML (ref 0.27–4.2)
VLDLC SERPL-MCNC: 25 MG/DL (ref 5–40)

## 2024-04-24 RX ORDER — LEVOTHYROXINE SODIUM 88 UG/1
88 TABLET ORAL DAILY
Qty: 60 TABLET | Refills: 0 | Status: SHIPPED | OUTPATIENT
Start: 2024-04-24

## 2024-05-13 ENCOUNTER — TELEPHONE (OUTPATIENT)
Dept: CARDIOLOGY | Facility: CLINIC | Age: 74
End: 2024-05-13
Payer: MEDICARE

## 2024-05-13 RX ORDER — FLECAINIDE ACETATE 50 MG/1
50 TABLET ORAL EVERY 12 HOURS
Qty: 180 TABLET | Refills: 3 | Status: SHIPPED | OUTPATIENT
Start: 2024-05-13

## 2024-05-17 ENCOUNTER — OFFICE VISIT (OUTPATIENT)
Dept: CARDIOLOGY | Facility: CLINIC | Age: 74
End: 2024-05-17
Payer: MEDICARE

## 2024-05-17 VITALS
HEIGHT: 58 IN | HEART RATE: 67 BPM | BODY MASS INDEX: 29.6 KG/M2 | DIASTOLIC BLOOD PRESSURE: 78 MMHG | WEIGHT: 141 LBS | SYSTOLIC BLOOD PRESSURE: 138 MMHG

## 2024-05-17 DIAGNOSIS — E78.2 HYPERLIPEMIA, MIXED: ICD-10-CM

## 2024-05-17 DIAGNOSIS — I10 HYPERTENSION, ESSENTIAL: ICD-10-CM

## 2024-05-17 DIAGNOSIS — I48.0 PAROXYSMAL ATRIAL FIBRILLATION: Primary | ICD-10-CM

## 2024-05-17 PROCEDURE — 3075F SYST BP GE 130 - 139MM HG: CPT | Performed by: INTERNAL MEDICINE

## 2024-05-17 PROCEDURE — 99214 OFFICE O/P EST MOD 30 MIN: CPT | Performed by: INTERNAL MEDICINE

## 2024-05-17 PROCEDURE — 1159F MED LIST DOCD IN RCRD: CPT | Performed by: INTERNAL MEDICINE

## 2024-05-17 PROCEDURE — 3078F DIAST BP <80 MM HG: CPT | Performed by: INTERNAL MEDICINE

## 2024-05-17 PROCEDURE — 1160F RVW MEDS BY RX/DR IN RCRD: CPT | Performed by: INTERNAL MEDICINE

## 2024-05-17 RX ORDER — ROSUVASTATIN CALCIUM 5 MG/1
5 TABLET, COATED ORAL DAILY
Qty: 90 TABLET | Refills: 3 | Status: SHIPPED | OUTPATIENT
Start: 2024-05-17

## 2024-06-06 ENCOUNTER — LAB (OUTPATIENT)
Dept: LAB | Facility: HOSPITAL | Age: 74
End: 2024-06-06
Payer: MEDICARE

## 2024-06-06 DIAGNOSIS — E03.9 HYPOTHYROIDISM, UNSPECIFIED TYPE: ICD-10-CM

## 2024-06-06 LAB — TSH SERPL DL<=0.05 MIU/L-ACNC: 0.58 UIU/ML (ref 0.27–4.2)

## 2024-06-06 PROCEDURE — 36415 COLL VENOUS BLD VENIPUNCTURE: CPT

## 2024-06-06 PROCEDURE — 84443 ASSAY THYROID STIM HORMONE: CPT

## 2024-06-07 DIAGNOSIS — E03.9 HYPOTHYROIDISM, UNSPECIFIED TYPE: ICD-10-CM

## 2024-06-07 RX ORDER — LEVOTHYROXINE SODIUM 88 UG/1
88 TABLET ORAL DAILY
Qty: 60 TABLET | Refills: 1 | Status: SHIPPED | OUTPATIENT
Start: 2024-06-07

## 2024-06-20 DIAGNOSIS — E03.9 HYPOTHYROIDISM, UNSPECIFIED TYPE: ICD-10-CM

## 2024-06-20 RX ORDER — LEVOTHYROXINE SODIUM 88 UG/1
88 TABLET ORAL DAILY
Qty: 60 TABLET | Refills: 0 | OUTPATIENT
Start: 2024-06-20

## 2024-06-26 DIAGNOSIS — E03.9 HYPOTHYROIDISM, UNSPECIFIED TYPE: ICD-10-CM

## 2024-06-26 RX ORDER — LEVOTHYROXINE SODIUM 88 UG/1
88 TABLET ORAL DAILY
Qty: 60 TABLET | Refills: 0 | Status: SHIPPED | OUTPATIENT
Start: 2024-06-26

## 2024-07-18 ENCOUNTER — HOSPITAL ENCOUNTER (OUTPATIENT)
Dept: BONE DENSITY | Facility: HOSPITAL | Age: 74
Discharge: HOME | End: 2024-07-18
Payer: MEDICARE

## 2024-07-18 ENCOUNTER — HOSPITAL ENCOUNTER (OUTPATIENT)
Dept: MAMMOGRAPHY | Facility: HOSPITAL | Age: 74
Discharge: HOME | End: 2024-07-18
Payer: MEDICARE

## 2024-07-18 DIAGNOSIS — Z78.0 POST-MENOPAUSAL: ICD-10-CM

## 2024-07-18 DIAGNOSIS — Z12.31 BREAST CANCER SCREENING BY MAMMOGRAM: ICD-10-CM

## 2024-07-18 PROCEDURE — 77067 SCR MAMMO BI INCL CAD: CPT

## 2024-07-18 PROCEDURE — 77063 BREAST TOMOSYNTHESIS BI: CPT

## 2024-07-18 PROCEDURE — 77080 DXA BONE DENSITY AXIAL: CPT

## 2024-07-27 DIAGNOSIS — E03.9 HYPOTHYROIDISM, UNSPECIFIED TYPE: ICD-10-CM

## 2024-07-29 RX ORDER — LEVOTHYROXINE SODIUM 88 UG/1
88 TABLET ORAL DAILY
Qty: 90 TABLET | Refills: 1 | Status: SHIPPED | OUTPATIENT
Start: 2024-07-29

## 2024-08-14 ENCOUNTER — TELEPHONE (OUTPATIENT)
Dept: SURGERY | Facility: CLINIC | Age: 74
End: 2024-08-14
Payer: MEDICARE

## 2024-10-17 ENCOUNTER — OFFICE VISIT (OUTPATIENT)
Dept: FAMILY MEDICINE CLINIC | Facility: CLINIC | Age: 74
End: 2024-10-17
Payer: MEDICARE

## 2024-10-17 VITALS
TEMPERATURE: 96.7 F | BODY MASS INDEX: 29.52 KG/M2 | OXYGEN SATURATION: 98 % | HEART RATE: 55 BPM | WEIGHT: 140.6 LBS | SYSTOLIC BLOOD PRESSURE: 167 MMHG | HEIGHT: 58 IN | DIASTOLIC BLOOD PRESSURE: 74 MMHG

## 2024-10-17 DIAGNOSIS — E78.5 HYPERLIPIDEMIA LDL GOAL <100: ICD-10-CM

## 2024-10-17 DIAGNOSIS — Z12.11 SCREENING FOR COLON CANCER: ICD-10-CM

## 2024-10-17 DIAGNOSIS — E03.9 HYPOTHYROIDISM, UNSPECIFIED TYPE: Primary | ICD-10-CM

## 2024-10-17 DIAGNOSIS — R22.1 NECK FULLNESS: ICD-10-CM

## 2024-10-17 DIAGNOSIS — Z86.0100 HISTORY OF COLONIC POLYPS: ICD-10-CM

## 2024-10-17 DIAGNOSIS — I10 ESSENTIAL HYPERTENSION: ICD-10-CM

## 2024-10-17 PROCEDURE — 1160F RVW MEDS BY RX/DR IN RCRD: CPT | Performed by: NURSE PRACTITIONER

## 2024-10-17 PROCEDURE — 1159F MED LIST DOCD IN RCRD: CPT | Performed by: NURSE PRACTITIONER

## 2024-10-17 PROCEDURE — 3078F DIAST BP <80 MM HG: CPT | Performed by: NURSE PRACTITIONER

## 2024-10-17 PROCEDURE — 99214 OFFICE O/P EST MOD 30 MIN: CPT | Performed by: NURSE PRACTITIONER

## 2024-10-17 PROCEDURE — 1126F AMNT PAIN NOTED NONE PRSNT: CPT | Performed by: NURSE PRACTITIONER

## 2024-10-17 PROCEDURE — 3077F SYST BP >= 140 MM HG: CPT | Performed by: NURSE PRACTITIONER

## 2024-10-17 RX ORDER — LEVOTHYROXINE SODIUM 88 UG/1
88 TABLET ORAL DAILY
Qty: 90 TABLET | Refills: 1 | Status: SHIPPED | OUTPATIENT
Start: 2024-10-17

## 2024-10-30 ENCOUNTER — OFFICE VISIT (OUTPATIENT)
Dept: SURGERY | Facility: CLINIC | Age: 74
End: 2024-10-30
Payer: MEDICARE

## 2024-10-30 ENCOUNTER — PREP FOR SURGERY (OUTPATIENT)
Dept: OTHER | Facility: HOSPITAL | Age: 74
End: 2024-10-30
Payer: MEDICARE

## 2024-10-30 VITALS — RESPIRATION RATE: 16 BRPM | HEIGHT: 57 IN | WEIGHT: 141 LBS | BODY MASS INDEX: 30.42 KG/M2

## 2024-10-30 DIAGNOSIS — Z86.0100 PERSONAL HISTORY OF COLON POLYPS, UNSPECIFIED: Primary | ICD-10-CM

## 2024-10-30 DIAGNOSIS — Z86.0100 HISTORY OF COLON POLYPS: Primary | ICD-10-CM

## 2024-10-30 PROCEDURE — 1160F RVW MEDS BY RX/DR IN RCRD: CPT | Performed by: SURGERY

## 2024-10-30 PROCEDURE — S0260 H&P FOR SURGERY: HCPCS | Performed by: SURGERY

## 2024-10-30 PROCEDURE — 1159F MED LIST DOCD IN RCRD: CPT | Performed by: SURGERY

## 2024-10-30 RX ORDER — SODIUM, POTASSIUM,MAG SULFATES 17.5-3.13G
SOLUTION, RECONSTITUTED, ORAL ORAL
Qty: 177 ML | Refills: 0 | Status: SHIPPED | OUTPATIENT
Start: 2024-10-30

## 2024-10-30 NOTE — PROGRESS NOTES
General Surgery/Colorectal Surgery Note    Patient Name:  Gisell Varghese  YOB: 1950  [id number]    Referring Provider: Alexa Curry APRN      Patient Care Team:  Alexa Curry APRN as PCP - General (Nurse Practitioner)  Khalif Perez MD as Consulting Physician (General Surgery)    Chief complaint history of colon polyp    Subjective .     History of present illness:    She comes in to discuss surveillance diagnostic colonoscopy.  History of colon polyps 2019 removed by Dr. Odom.  History of Eliquis use.  No family history of colorectal cancer.  Asymptomatic with no weight loss, abdominal pain, blood in her stool.  No recent chest pain.      History:  Past Medical History:   Diagnosis Date    Essential hypertension 2020    Fatty liver disease, nonalcoholic     Glaucoma     Hemorrhoid     Hyperlipidemia LDL goal <100 2020    Hypothyroidism     Ingrowing toenail     Muscle cramps     Paroxysmal atrial fibrillation     Spinal stenosis of cervical region     Trigger middle finger of left hand 2015       Past Surgical History:   Procedure Laterality Date    BACK SURGERY Right 2009    L5-S1 laminectomy(Dr. Bains)    CATARACT EXTRACTION Bilateral      SECTION  .    COLONOSCOPY  2015    HAND SURGERY      HYSTERECTOMY         Family History   Problem Relation Age of Onset    Arthritis Mother     Osteoporosis Mother     Arthritis Father     Arthritis Sister     Arthritis Brother     Heart disease Brother         MI    Stroke Other        Social History     Tobacco Use    Smoking status: Never    Smokeless tobacco: Never   Vaping Use    Vaping status: Never Used   Substance Use Topics    Alcohol use: Never    Drug use: Never       Review of Systems  All systems were reviewed and negative except for:   Review of Systems   Constitutional: Negative for chills, fever and unexpected weight loss.   HENT: Negative for congestion, nosebleeds and voice change.     Eyes: Negative for blurred vision, double vision and discharge.   Respiratory: Negative for apnea, chest tightness and shortness of breath.    Cardiovascular: Negative for chest pain and leg swelling.   Gastrointestinal:        See HPI   Endocrine: Negative for cold intolerance and heat intolerance.   Genitourinary: Negative for dysuria, hematuria and urgency.   Musculoskeletal: Negative for back pain, joint swelling and neck pain.   Skin: Negative for color change and dry skin.   Neurological: Negative for dizziness and confusion.   Hematological: Negative for adenopathy.   Psychiatric/Behavioral: Negative for agitation and behavioral problems.     MEDS:  Prior to Admission medications    Medication Sig Start Date End Date Taking? Authorizing Provider   apixaban (Eliquis) 5 MG tablet tablet MYRIAM AUSTIN 1/27/23  Yes Diamond Blair APRN   atenolol (TENORMIN) 50 MG tablet Take 1 tablet by mouth 2 (Two) Times a Day. 3/4/24  Yes Alexa Curry APRN   clobetasol (TEMOVATE) 0.05 % cream APPLY 1 APPLICATION TOPICALLY TO THE APPROPRIATE AREA AS DIRECTED 2 TIMES A DAY AS NEEDED (RASH). 8/14/23  Yes Alexa Curry APRN   docusate sodium (COLACE) 100 MG capsule Take 2 capsules by mouth Daily.   Yes ProviderAxel MD   dorzolamide (TRUSOPT) 2 % ophthalmic solution  3/19/24  Yes Axel Beatty MD   flecainide (TAMBOCOR) 50 MG tablet Take 1 tablet by mouth Every 12 (Twelve) Hours. 5/13/24  Yes JOHN Mitchell MD   latanoprost (XALATAN) 0.005 % ophthalmic solution Administer 1 drop to the right eye Every Night.   Yes ProviderAxel MD   levothyroxine (SYNTHROID, LEVOTHROID) 88 MCG tablet Take 1 tablet by mouth Daily. 10/17/24  Yes Alexa Curry APRN   Magnesium 500 MG tablet Take 1 tablet by mouth Daily.   Yes Axel Beatty MD   omeprazole (priLOSEC) 20 MG capsule Take 1 capsule by mouth Daily. 2/7/24  Yes Alexa Curry APRN   polyethylene glycol (MIRALAX) 17 GM/SCOOP powder Take  17 g by mouth Daily.   Yes Provider, MD Axel   rosuvastatin (CRESTOR) 5 MG tablet Take 1 tablet by mouth Daily. 5/17/24  Yes JOHN Mitchell MD   nitroglycerin (NITROSTAT) 0.4 MG SL tablet Place 1 tablet under the tongue Every 5 (Five) Minutes As Needed for Chest Pain. Take no more than 3 doses in 15 minutes.  Patient not taking: Reported on 4/22/2024 10/8/21   Timothy Shaikh MD   sodium-potassium-magnesium sulfates (SUPREP) 17.5-3.13-1.6 GM/177ML solution oral solution Take as directed 10/30/24   Khalif Perez MD        Allergies:  Cephalexin, Ciprofloxacin, Hydrocodone-acetaminophen, Hydrocodone-ibuprofen, Penicillins, and Adhesive tape    Objective     Vital Signs   Resp:  [16] 16    Physical Exam:     General Appearance:    Alert, cooperative, in no acute distress   Head:    Normocephalic, without obvious abnormality, atraumatic   Eyes:          Conjunctivae and sclerae normal, no icterus,     Ears:    Ears appear intact with no abnormalities noted   Throat:   No oral lesions, no thrush, oral mucosa moist   Neck:   No adenopathy, supple, trachea midline, no thyromegaly   Back:     No kyphosis present, no scoliosis present, no skin lesions,      erythema or scars, no tenderness to percussion or                   palpation,   range of motion normal   Lungs:     Clear to auscultation,respirations regular, even and                  unlabored    Heart:    Regular rhythm and normal rate, normal S1 and S2, no            murmur, no gallop, no rub, no click   Chest Wall:    No abnormalities observed   Abdomen:     Normal bowel sounds, no masses, no organomegaly, soft        non-tender, non-distended, no guarding, no rebound                tenderness   Rectal:        Extremities:   Moves all extremities well, no edema, no cyanosis, no             redness   Pulses:   Pulses palpable and equal bilaterally   Skin:   No bleeding, bruising or rash   Lymph nodes:   No palpable adenopathy   Neurologic:   A/o  "x 4 with no deficits       Results Review:   {Results Review:26527::\"I reviewed the patient's new clinical results.\"    LABS/IMAGING:  Results for orders placed or performed in visit on 06/06/24   TSH    Collection Time: 06/06/24 11:44 AM    Specimen: Blood   Result Value Ref Range    TSH 0.581 0.270 - 4.200 uIU/mL        Result Review : Labs  Result Review  Imaging  Med Tab  Media     Assessment & Plan     Personal history of colorectal polyps  Eliquis use    Discussion with patient.  I recommended colonoscopy.  Benefits and alternatives discussed.  Risk procedure including bleeding, perforation, missing a polyp discussed.  Hold Eliquis 2 days prior.  All questions answered.  She agrees with the plan.  Orders placed.  Thank you for the consult.              This document has been electronically signed by Khalif Perez MD  October 30, 2024 13:48 EDT  "

## 2024-10-30 NOTE — LETTER
2024     LUCAS Nunes  [address] [place] [address]    Patient: Gisell Varghese   YOB: 1950   Date of Visit: 10/30/2024     Dear LUCAS Nunes:       Thank you for referring Gisell Varghese to me for evaluation. Below are the relevant portions of my assessment and plan of care.    If you have questions, please do not hesitate to call me. I look forward to following Gisell along with you.         Sincerely,        Khalif Perez MD        CC: No Recipients    Khalif Perez MD  10/31/24 0846  Sign when Signing Visit  General Surgery/Colorectal Surgery Note    Patient Name:  Gisell Varghese  YOB: 1950  [id number]    Referring Provider: Alexa Curry APRN      Patient Care Team:  Alexa Curry APRN as PCP - General (Nurse Practitioner)  Khalif Perez MD as Consulting Physician (General Surgery)    Chief complaint history of colon polyp    Subjective.     History of present illness:    She comes in to discuss surveillance diagnostic colonoscopy.  History of colon polyps 2019 removed by Dr. Odom.  History of Eliquis use.  No family history of colorectal cancer.  Asymptomatic with no weight loss, abdominal pain, blood in her stool.  No recent chest pain.      History:  Past Medical History:   Diagnosis Date   • Essential hypertension 2020   • Fatty liver disease, nonalcoholic    • Glaucoma    • Hemorrhoid    • Hyperlipidemia LDL goal <100 2020   • Hypothyroidism    • Ingrowing toenail    • Muscle cramps    • Paroxysmal atrial fibrillation    • Spinal stenosis of cervical region    • Trigger middle finger of left hand 2015       Past Surgical History:   Procedure Laterality Date   • BACK SURGERY Right 2009    L5-S1 laminectomy(Dr. Bains)   • CATARACT EXTRACTION Bilateral    •  SECTION  .   • COLONOSCOPY     • HAND SURGERY     • HYSTERECTOMY         Family History    Problem Relation Age of Onset   • Arthritis Mother    • Osteoporosis Mother    • Arthritis Father    • Arthritis Sister    • Arthritis Brother    • Heart disease Brother         MI   • Stroke Other        Social History     Tobacco Use   • Smoking status: Never   • Smokeless tobacco: Never   Vaping Use   • Vaping status: Never Used   Substance Use Topics   • Alcohol use: Never   • Drug use: Never       Review of Systems  All systems were reviewed and negative except for:   Review of Systems   Constitutional: Negative for chills, fever and unexpected weight loss.   HENT: Negative for congestion, nosebleeds and voice change.    Eyes: Negative for blurred vision, double vision and discharge.   Respiratory: Negative for apnea, chest tightness and shortness of breath.    Cardiovascular: Negative for chest pain and leg swelling.   Gastrointestinal:        See HPI   Endocrine: Negative for cold intolerance and heat intolerance.   Genitourinary: Negative for dysuria, hematuria and urgency.   Musculoskeletal: Negative for back pain, joint swelling and neck pain.   Skin: Negative for color change and dry skin.   Neurological: Negative for dizziness and confusion.   Hematological: Negative for adenopathy.   Psychiatric/Behavioral: Negative for agitation and behavioral problems.     MEDS:  Prior to Admission medications    Medication Sig Start Date End Date Taking? Authorizing Provider   apixaban (Eliquis) 5 MG tablet tablet MYRIAM AUSTIN 1/27/23  Yes Diamond Blair APRN   atenolol (TENORMIN) 50 MG tablet Take 1 tablet by mouth 2 (Two) Times a Day. 3/4/24  Yes Alexa Curry APRN   clobetasol (TEMOVATE) 0.05 % cream APPLY 1 APPLICATION TOPICALLY TO THE APPROPRIATE AREA AS DIRECTED 2 TIMES A DAY AS NEEDED (RASH). 8/14/23  Yes Alexa Curry APRN   docusate sodium (COLACE) 100 MG capsule Take 2 capsules by mouth Daily.   Yes Provider, MD Axel   dorzolamide (TRUSOPT) 2 % ophthalmic solution  3/19/24  Yes  ProviderAxel MD   flecainide (TAMBOCOR) 50 MG tablet Take 1 tablet by mouth Every 12 (Twelve) Hours. 5/13/24  Yes JOHN Mitchell MD   latanoprost (XALATAN) 0.005 % ophthalmic solution Administer 1 drop to the right eye Every Night.   Yes Axel Beatty MD   levothyroxine (SYNTHROID, LEVOTHROID) 88 MCG tablet Take 1 tablet by mouth Daily. 10/17/24  Yes Alexa Curry APRN   Magnesium 500 MG tablet Take 1 tablet by mouth Daily.   Yes Axel Beatty MD   omeprazole (priLOSEC) 20 MG capsule Take 1 capsule by mouth Daily. 2/7/24  Yes Alexa Curry APRN   polyethylene glycol (MIRALAX) 17 GM/SCOOP powder Take 17 g by mouth Daily.   Yes Axel Beatty MD   rosuvastatin (CRESTOR) 5 MG tablet Take 1 tablet by mouth Daily. 5/17/24  Yes JOHN Mitchell MD   nitroglycerin (NITROSTAT) 0.4 MG SL tablet Place 1 tablet under the tongue Every 5 (Five) Minutes As Needed for Chest Pain. Take no more than 3 doses in 15 minutes.  Patient not taking: Reported on 4/22/2024 10/8/21   Timothy Shaikh MD   sodium-potassium-magnesium sulfates (SUPREP) 17.5-3.13-1.6 GM/177ML solution oral solution Take as directed 10/30/24   Khalif Perez MD        Allergies:  Cephalexin, Ciprofloxacin, Hydrocodone-acetaminophen, Hydrocodone-ibuprofen, Penicillins, and Adhesive tape    Objective    Vital Signs   Resp:  [16] 16    Physical Exam:     General Appearance:    Alert, cooperative, in no acute distress   Head:    Normocephalic, without obvious abnormality, atraumatic   Eyes:          Conjunctivae and sclerae normal, no icterus,     Ears:    Ears appear intact with no abnormalities noted   Throat:   No oral lesions, no thrush, oral mucosa moist   Neck:   No adenopathy, supple, trachea midline, no thyromegaly   Back:     No kyphosis present, no scoliosis present, no skin lesions,      erythema or scars, no tenderness to percussion or                   palpation,   range of motion normal   Lungs:     Clear  "to auscultation,respirations regular, even and                  unlabored    Heart:    Regular rhythm and normal rate, normal S1 and S2, no            murmur, no gallop, no rub, no click   Chest Wall:    No abnormalities observed   Abdomen:     Normal bowel sounds, no masses, no organomegaly, soft        non-tender, non-distended, no guarding, no rebound                tenderness   Rectal:        Extremities:   Moves all extremities well, no edema, no cyanosis, no             redness   Pulses:   Pulses palpable and equal bilaterally   Skin:   No bleeding, bruising or rash   Lymph nodes:   No palpable adenopathy   Neurologic:   A/o x 4 with no deficits       Results Review:   {Results Review:41170::\"I reviewed the patient's new clinical results.\"    LABS/IMAGING:  Results for orders placed or performed in visit on 06/06/24   TSH    Collection Time: 06/06/24 11:44 AM    Specimen: Blood   Result Value Ref Range    TSH 0.581 0.270 - 4.200 uIU/mL        Result Review: Labs  Result Review  Imaging  Med Tab  Media     Assessment & Plan    Personal history of colorectal polyps  Eliquis use    Discussion with patient.  I recommended colonoscopy.  Benefits and alternatives discussed.  Risk procedure including bleeding, perforation, missing a polyp discussed.  Hold Eliquis 2 days prior.  All questions answered.  She agrees with the plan.  Orders placed.  Thank you for the consult.              This document has been electronically signed by Khalif Perez MD  October 30, 2024 13:48 EDT  "

## 2024-11-22 ENCOUNTER — HOSPITAL ENCOUNTER (EMERGENCY)
Facility: HOSPITAL | Age: 74
Discharge: HOME | End: 2024-11-22
Attending: EMERGENCY MEDICINE
Payer: MEDICARE

## 2024-11-22 ENCOUNTER — APPOINTMENT (OUTPATIENT)
Dept: GENERAL RADIOLOGY | Facility: HOSPITAL | Age: 74
End: 2024-11-22
Payer: MEDICARE

## 2024-11-22 ENCOUNTER — APPOINTMENT (OUTPATIENT)
Dept: CT IMAGING | Facility: HOSPITAL | Age: 74
End: 2024-11-22
Payer: MEDICARE

## 2024-11-22 VITALS
HEART RATE: 74 BPM | SYSTOLIC BLOOD PRESSURE: 135 MMHG | TEMPERATURE: 98 F | BODY MASS INDEX: 29.94 KG/M2 | OXYGEN SATURATION: 96 % | WEIGHT: 142.64 LBS | RESPIRATION RATE: 18 BRPM | HEIGHT: 58 IN | DIASTOLIC BLOOD PRESSURE: 90 MMHG
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
## 2024-11-22 DIAGNOSIS — S63.91XA HAND SPRAIN, RIGHT, INITIAL ENCOUNTER: ICD-10-CM

## 2024-11-22 DIAGNOSIS — S39.012A STRAIN OF SACRUM, INITIAL ENCOUNTER: Primary | ICD-10-CM

## 2024-11-22 LAB
ALBUMIN SERPL-MCNC: 4.2 G/DL (ref 3.5–5.2)
ALBUMIN/GLOB SERPL: 1.1 G/DL
ALP SERPL-CCNC: 100 U/L (ref 39–117)
ALT SERPL W P-5'-P-CCNC: 33 U/L (ref 1–33)
ANION GAP SERPL CALCULATED.3IONS-SCNC: 13.1 MMOL/L (ref 5–15)
AST SERPL-CCNC: 58 U/L (ref 1–32)
BASOPHILS # BLD AUTO: 0.02 10*3/MM3 (ref 0–0.2)
BASOPHILS NFR BLD AUTO: 0.2 % (ref 0–1.5)
BILIRUB SERPL-MCNC: 0.5 MG/DL (ref 0–1.2)
BUN SERPL-MCNC: 16 MG/DL (ref 8–23)
BUN/CREAT SERPL: 15 (ref 7–25)
CALCIUM SPEC-SCNC: 10 MG/DL (ref 8.6–10.5)
CHLORIDE SERPL-SCNC: 98 MMOL/L (ref 98–107)
CO2 SERPL-SCNC: 22.9 MMOL/L (ref 22–29)
CREAT SERPL-MCNC: 1.07 MG/DL (ref 0.57–1)
DEPRECATED RDW RBC AUTO: 46.4 FL (ref 37–54)
EGFRCR SERPLBLD CKD-EPI 2021: 55.3 ML/MIN/1.73
EOSINOPHIL # BLD AUTO: 0.18 10*3/MM3 (ref 0–0.4)
EOSINOPHIL NFR BLD AUTO: 1.7 % (ref 0.3–6.2)
ERYTHROCYTE [DISTWIDTH] IN BLOOD BY AUTOMATED COUNT: 13.2 % (ref 12.3–15.4)
GLOBULIN UR ELPH-MCNC: 3.8 GM/DL
GLUCOSE SERPL-MCNC: 145 MG/DL (ref 65–99)
HCT VFR BLD AUTO: 41.1 % (ref 34–46.6)
HGB BLD-MCNC: 13.7 G/DL (ref 12–15.9)
HOLD SPECIMEN: NORMAL
HOLD SPECIMEN: NORMAL
IMM GRANULOCYTES # BLD AUTO: 0.05 10*3/MM3 (ref 0–0.05)
IMM GRANULOCYTES NFR BLD AUTO: 0.5 % (ref 0–0.5)
LYMPHOCYTES # BLD AUTO: 2.68 10*3/MM3 (ref 0.7–3.1)
LYMPHOCYTES NFR BLD AUTO: 26 % (ref 19.6–45.3)
MCH RBC QN AUTO: 32 PG (ref 26.6–33)
MCHC RBC AUTO-ENTMCNC: 33.3 G/DL (ref 31.5–35.7)
MCV RBC AUTO: 96 FL (ref 79–97)
MONOCYTES # BLD AUTO: 0.87 10*3/MM3 (ref 0.1–0.9)
MONOCYTES NFR BLD AUTO: 8.4 % (ref 5–12)
NEUTROPHILS NFR BLD AUTO: 6.52 10*3/MM3 (ref 1.7–7)
NEUTROPHILS NFR BLD AUTO: 63.2 % (ref 42.7–76)
NRBC BLD AUTO-RTO: 0 /100 WBC (ref 0–0.2)
PLATELET # BLD AUTO: 162 10*3/MM3 (ref 140–450)
PMV BLD AUTO: 11.9 FL (ref 6–12)
POTASSIUM SERPL-SCNC: 4 MMOL/L (ref 3.5–5.2)
PROT SERPL-MCNC: 8 G/DL (ref 6–8.5)
RBC # BLD AUTO: 4.28 10*6/MM3 (ref 3.77–5.28)
SODIUM SERPL-SCNC: 134 MMOL/L (ref 136–145)
WBC NRBC COR # BLD AUTO: 10.32 10*3/MM3 (ref 3.4–10.8)
WHOLE BLOOD HOLD COAG: NORMAL
WHOLE BLOOD HOLD SPECIMEN: NORMAL

## 2024-11-22 PROCEDURE — 70450 CT HEAD/BRAIN W/O DYE: CPT

## 2024-11-22 PROCEDURE — 99284 EMERGENCY DEPT VISIT MOD MDM: CPT

## 2024-11-22 PROCEDURE — 73130 X-RAY EXAM OF HAND: CPT

## 2024-11-22 PROCEDURE — 71101 X-RAY EXAM UNILAT RIBS/CHEST: CPT

## 2024-11-22 PROCEDURE — 72220 X-RAY EXAM SACRUM TAILBONE: CPT

## 2024-11-22 PROCEDURE — 80053 COMPREHEN METABOLIC PANEL: CPT | Performed by: EMERGENCY MEDICINE

## 2024-11-22 PROCEDURE — 85025 COMPLETE CBC W/AUTO DIFF WBC: CPT | Performed by: EMERGENCY MEDICINE
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
## 2024-11-26 ENCOUNTER — TELEPHONE (OUTPATIENT)
Dept: SURGERY | Facility: CLINIC | Age: 74
End: 2024-11-26
Payer: MEDICARE
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
## 2024-12-04 ENCOUNTER — TELEPHONE (OUTPATIENT)
Dept: FAMILY MEDICINE CLINIC | Facility: CLINIC | Age: 74
End: 2024-12-04
Payer: OTHER MISCELLANEOUS

## 2024-12-04 VITALS
HEIGHT: 58 IN | BODY MASS INDEX: 29.6 KG/M2 | OXYGEN SATURATION: 96 % | SYSTOLIC BLOOD PRESSURE: 136 MMHG | DIASTOLIC BLOOD PRESSURE: 79 MMHG | HEART RATE: 62 BPM | WEIGHT: 141 LBS | TEMPERATURE: 97 F

## 2024-12-04 DIAGNOSIS — Z79.899 MEDICATION MANAGEMENT: ICD-10-CM

## 2024-12-04 DIAGNOSIS — M53.3 COCCYX PAIN: Primary | ICD-10-CM
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
## 2024-12-04 DIAGNOSIS — S39.012D STRAIN OF SACRUM, SUBSEQUENT ENCOUNTER: ICD-10-CM

## 2024-12-04 DIAGNOSIS — R74.8 ELEVATED LIVER ENZYMES: ICD-10-CM

## 2024-12-04 LAB
AMPHET+METHAMPHET UR QL: NEGATIVE
AMPHETAMINE INTERNAL CONTROL: NORMAL
AMPHETAMINES UR QL: NEGATIVE
BARBITURATE INTERNAL CONTROL: NORMAL
BARBITURATES UR QL SCN: NEGATIVE
BENZODIAZ UR QL SCN: NEGATIVE
BENZODIAZEPINE INTERNAL CONTROL: NORMAL
BUPRENORPHINE INTERNAL CONTROL: NORMAL
BUPRENORPHINE SERPL-MCNC: NEGATIVE NG/ML
CANNABINOIDS SERPL QL: NEGATIVE
COCAINE INTERNAL CONTROL: NORMAL
COCAINE UR QL: NEGATIVE
EXPIRATION DATE: NORMAL
Lab: NORMAL
MDMA (ECSTASY) INTERNAL CONTROL: NORMAL
MDMA UR QL SCN: NEGATIVE
METHADONE INTERNAL CONTROL: NORMAL
METHADONE UR QL SCN: NEGATIVE
METHAMPHETAMINE INTERNAL CONTROL: NORMAL
MORPHINE INTERNAL CONTROL: NORMAL
MORPHINE/OPIATES SCREEN, URINE: NEGATIVE
OXYCODONE INTERNAL CONTROL: NORMAL
OXYCODONE UR QL SCN: NEGATIVE
PCP UR QL SCN: NEGATIVE
PHENCYCLIDINE INTERNAL CONTROL: NORMAL
THC INTERNAL CONTROL: NORMAL

## 2024-12-04 PROCEDURE — 99213 OFFICE O/P EST LOW 20 MIN: CPT | Performed by: NURSE PRACTITIONER

## 2024-12-04 RX ORDER — TRAMADOL HYDROCHLORIDE 50 MG/1
50 TABLET ORAL EVERY 6 HOURS PRN
Qty: 60 TABLET | Refills: 0 | Status: SHIPPED | OUTPATIENT
Start: 2024-12-04

## 2024-12-04 NOTE — TELEPHONE ENCOUNTER
Caller: [facility] [address] [place], [place] - [facility] [address] [facility] - [phone]  - [phone] FX    Relationship: Pharmacy    Best call back number:[phone]    Which medication are you concerned about:     traMADol (ULTRAM) 50 MG tablet       Who prescribed you this medication:  Isabela Roberts APRN     What are your concerns: REQUESTING A CALL BACK ON DIRECTIONS.
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
## 2025-02-27 ENCOUNTER — TELEPHONE (OUTPATIENT)
Dept: FAMILY MEDICINE CLINIC | Facility: CLINIC | Age: 75
End: 2025-02-27
Payer: OTHER MISCELLANEOUS

## 2025-02-27 DIAGNOSIS — Z01.84 IMMUNITY TO MEASLES, MUMPS, AND RUBELLA DETERMINED BY SEROLOGIC TEST: Primary | ICD-10-CM

## 2025-02-27 NOTE — TELEPHONE ENCOUNTER
Patient called asking if there is any vaccine or precautions her and her  could take to decrease risk of measles. She states they both had it when they were children and want to know if they would still be susceptible now? Concerned because  has cancer and is high risk. Please advise.

## 2025-03-07 ENCOUNTER — TELEPHONE (OUTPATIENT)
Dept: FAMILY MEDICINE CLINIC | Facility: CLINIC | Age: 75
End: 2025-03-07

## 2025-03-17 RX ORDER — OMEPRAZOLE 20 MG/1
20 CAPSULE, DELAYED RELEASE ORAL DAILY
Qty: 90 CAPSULE | Refills: 0 | Status: SHIPPED | OUTPATIENT
Start: 2025-03-17 | End: 2025-03-19 | Stop reason: SDUPTHER

## 2025-03-18 DIAGNOSIS — I10 ESSENTIAL HYPERTENSION: ICD-10-CM

## 2025-03-18 RX ORDER — ATENOLOL 50 MG/1
50 TABLET ORAL 2 TIMES DAILY
Qty: 180 TABLET | Refills: 1 | Status: SHIPPED | OUTPATIENT
Start: 2025-03-18

## 2025-03-19 ENCOUNTER — OFFICE VISIT (OUTPATIENT)
Dept: FAMILY MEDICINE CLINIC | Facility: CLINIC | Age: 75
End: 2025-03-19
Payer: MEDICARE

## 2025-03-19 VITALS
SYSTOLIC BLOOD PRESSURE: 173 MMHG | TEMPERATURE: 96.8 F | OXYGEN SATURATION: 96 % | DIASTOLIC BLOOD PRESSURE: 81 MMHG | HEART RATE: 65 BPM | HEIGHT: 58 IN | BODY MASS INDEX: 28.34 KG/M2 | WEIGHT: 135 LBS

## 2025-03-19 DIAGNOSIS — R10.11 RIGHT UPPER QUADRANT ABDOMINAL PAIN: Primary | ICD-10-CM

## 2025-03-19 DIAGNOSIS — E78.5 HYPERLIPIDEMIA LDL GOAL <100: ICD-10-CM

## 2025-03-19 DIAGNOSIS — R11.0 NAUSEA: ICD-10-CM

## 2025-03-19 DIAGNOSIS — K59.04 CHRONIC IDIOPATHIC CONSTIPATION: ICD-10-CM

## 2025-03-19 DIAGNOSIS — Z01.84 IMMUNITY TO MEASLES, MUMPS, AND RUBELLA DETERMINED BY SEROLOGIC TEST: ICD-10-CM

## 2025-03-19 DIAGNOSIS — E03.9 HYPOTHYROIDISM, UNSPECIFIED TYPE: ICD-10-CM

## 2025-03-19 LAB
ALBUMIN SERPL-MCNC: 4.1 G/DL (ref 3.5–5.2)
ALBUMIN/GLOB SERPL: 1.1 G/DL
ALP SERPL-CCNC: 105 U/L (ref 39–117)
ALT SERPL W P-5'-P-CCNC: 15 U/L (ref 1–33)
AMYLASE SERPL-CCNC: 170 U/L (ref 28–100)
ANION GAP SERPL CALCULATED.3IONS-SCNC: 12.1 MMOL/L (ref 5–15)
AST SERPL-CCNC: 23 U/L (ref 1–32)
BILIRUB SERPL-MCNC: 0.4 MG/DL (ref 0–1.2)
BUN SERPL-MCNC: 15 MG/DL (ref 8–23)
BUN/CREAT SERPL: 12.9 (ref 7–25)
CALCIUM SPEC-SCNC: 10.2 MG/DL (ref 8.6–10.5)
CHLORIDE SERPL-SCNC: 103 MMOL/L (ref 98–107)
CHOLEST SERPL-MCNC: 156 MG/DL (ref 0–200)
CO2 SERPL-SCNC: 22.9 MMOL/L (ref 22–29)
CREAT SERPL-MCNC: 1.16 MG/DL (ref 0.57–1)
DEPRECATED RDW RBC AUTO: 43 FL (ref 37–54)
EGFRCR SERPLBLD CKD-EPI 2021: 50.2 ML/MIN/1.73
ERYTHROCYTE [DISTWIDTH] IN BLOOD BY AUTOMATED COUNT: 12.4 % (ref 12.3–15.4)
GLOBULIN UR ELPH-MCNC: 3.7 GM/DL
GLUCOSE SERPL-MCNC: 101 MG/DL (ref 65–99)
HCT VFR BLD AUTO: 38.9 % (ref 34–46.6)
HDLC SERPL-MCNC: 53 MG/DL (ref 40–60)
HGB BLD-MCNC: 12.5 G/DL (ref 12–15.9)
LDLC SERPL CALC-MCNC: 81 MG/DL (ref 0–100)
LDLC/HDLC SERPL: 1.47 {RATIO}
LIPASE SERPL-CCNC: 17 U/L (ref 13–60)
MCH RBC QN AUTO: 30.6 PG (ref 26.6–33)
MCHC RBC AUTO-ENTMCNC: 32.1 G/DL (ref 31.5–35.7)
MCV RBC AUTO: 95.1 FL (ref 79–97)
PLATELET # BLD AUTO: 158 10*3/MM3 (ref 140–450)
PMV BLD AUTO: 13 FL (ref 6–12)
POTASSIUM SERPL-SCNC: 4.1 MMOL/L (ref 3.5–5.2)
PROT SERPL-MCNC: 7.8 G/DL (ref 6–8.5)
RBC # BLD AUTO: 4.09 10*6/MM3 (ref 3.77–5.28)
SODIUM SERPL-SCNC: 138 MMOL/L (ref 136–145)
T4 FREE SERPL-MCNC: 1.9 NG/DL (ref 0.92–1.68)
TRIGL SERPL-MCNC: 126 MG/DL (ref 0–150)
TSH SERPL DL<=0.05 MIU/L-ACNC: 0.07 UIU/ML (ref 0.27–4.2)
VLDLC SERPL-MCNC: 22 MG/DL (ref 5–40)
WBC NRBC COR # BLD AUTO: 7.68 10*3/MM3 (ref 3.4–10.8)

## 2025-03-19 PROCEDURE — 86762 RUBELLA ANTIBODY: CPT | Performed by: NURSE PRACTITIONER

## 2025-03-19 PROCEDURE — 86765 RUBEOLA ANTIBODY: CPT | Performed by: NURSE PRACTITIONER

## 2025-03-19 PROCEDURE — 82150 ASSAY OF AMYLASE: CPT | Performed by: NURSE PRACTITIONER

## 2025-03-19 PROCEDURE — 80053 COMPREHEN METABOLIC PANEL: CPT | Performed by: NURSE PRACTITIONER

## 2025-03-19 PROCEDURE — 84439 ASSAY OF FREE THYROXINE: CPT | Performed by: NURSE PRACTITIONER

## 2025-03-19 PROCEDURE — 86735 MUMPS ANTIBODY: CPT | Performed by: NURSE PRACTITIONER

## 2025-03-19 PROCEDURE — 83690 ASSAY OF LIPASE: CPT | Performed by: NURSE PRACTITIONER

## 2025-03-19 PROCEDURE — 80061 LIPID PANEL: CPT | Performed by: NURSE PRACTITIONER

## 2025-03-19 PROCEDURE — 85027 COMPLETE CBC AUTOMATED: CPT | Performed by: NURSE PRACTITIONER

## 2025-03-19 PROCEDURE — 84443 ASSAY THYROID STIM HORMONE: CPT | Performed by: NURSE PRACTITIONER

## 2025-03-19 RX ORDER — DORZOLAMIDE HYDROCHLORIDE AND TIMOLOL MALEATE 20; 5 MG/ML; MG/ML
SOLUTION/ DROPS OPHTHALMIC
COMMUNITY
Start: 2025-01-16

## 2025-03-19 RX ORDER — OMEPRAZOLE 40 MG/1
40 CAPSULE, DELAYED RELEASE ORAL DAILY
Qty: 90 CAPSULE | Refills: 1 | Status: SHIPPED | OUTPATIENT
Start: 2025-03-19

## 2025-03-19 NOTE — PROGRESS NOTES
Chief Complaint  Abdominal Pain (Ruq pain, urgent care visit last night. Initially started March 1st has got worse, everytime she eats)    Subjective          Gisell Varghese is a 74 y.o. female who presents to [facility]    History of Present Illness  History of Present Illness  The patient presents for evaluation of right upper quadrant pain, constipation, and neck pain.    She reports experiencing right upper quadrant pain that radiates to her back, accompanied by a squeezing sensation. She also experiences significant gas and bloating, which she believes is disproportionate to her food intake. She has been unable to evacuate properly for the past 3 days. She does not experience acid reflux but reports burping. She does not experience any symptoms at night, as they typically resolve before bedtime. The symptoms usually occur postprandially and did not persist as long as they typically do last night. However, the pain was more severe than usual last night, prompting her visit to urgent care. She has an upcoming appointment on 03/23/2025 and requests to have her thyroid and cholesterol levels checked during today's lab work. She has been managing her symptoms with Gas-X post meals and daily omeprazole.    She has been taking MiraLAX every morning followed by an 8-ounce glass of prune juice for constipation, but this regimen has been ineffective for the past 3 days. She had a bowel movement yesterday, which was the most significant in recent days, but it was not satisfactory. She did not take any medication today due to her scheduled appointment.    She also reports neck pain upon palpation.    Supplemental Information  She has a history of shortness of breath, which she attributes to her atrial fibrillation. She experienced numbness in her arm and neck pain yesterday, which led her to seek medical attention at urgent care. An EKG performed at urgent care revealed a normal heart  rate of 65 in sinus rhythm.    MEDICATIONS  Current: omeprazole, MiraLAX, prune juice      Little interest or pleasure in doing things? Not at all   Feeling down, depressed, or hopeless? Not at all   PHQ-2 Total Score 0                Health Maintenance Due   Topic Date Due    COLORECTAL CANCER SCREENING  2024    ANNUAL WELLNESS VISIT  2025        Review of Systems   Constitutional:  Negative for chills, fatigue and fever.   Respiratory:  Negative for cough and shortness of breath.    Cardiovascular:  Negative for chest pain and palpitations.   Gastrointestinal:  Positive for abdominal pain and constipation. Negative for diarrhea, nausea and vomiting.   Musculoskeletal:  Positive for neck pain. Negative for back pain.   Skin:  Negative for rash.   Neurological:  Negative for dizziness and headaches.          Medical History: has a past medical history of Essential hypertension (2020), Fatty liver disease, nonalcoholic, Glaucoma, Hemorrhoid, Hyperlipidemia LDL goal <100 (2020), Hypothyroidism, Ingrowing toenail, Muscle cramps, Paroxysmal atrial fibrillation, Spinal stenosis of cervical region, and Trigger middle finger of left hand (2015).     Surgical History: has a past surgical history that includes Back surgery (Right, ); Cataract extraction (Bilateral, );  section (.); Colonoscopy (); Hand surgery; and Hysterectomy ().     Family History: family history includes Arthritis in her brother, father, mother, and sister; Heart disease in her brother; Osteoporosis in her mother; Stroke in an other family member.     Social History: reports that she has never smoked. She has never used smokeless tobacco. She reports that she does not drink alcohol and does not use drugs.    Allergies: Cephalexin, Ciprofloxacin, Hydrocodone-acetaminophen, Hydrocodone-ibuprofen, Penicillins, and Adhesive tape      Current Outpatient Medications:     apixaban (Eliquis) 5 MG tablet  tablet, MYRIAM AUSTIN, Disp: 180 tablet, Rfl: 3    atenolol (TENORMIN) 50 MG tablet, Take 1 tablet by mouth twice daily, Disp: 180 tablet, Rfl: 1    clobetasol (TEMOVATE) 0.05 % cream, APPLY 1 APPLICATION TOPICALLY TO THE APPROPRIATE AREA AS DIRECTED 2 TIMES A DAY AS NEEDED (RASH)., Disp: 45 g, Rfl: 0    docusate sodium (COLACE) 100 MG capsule, Take 2 capsules by mouth Daily., Disp: , Rfl:     dorzolamide-timolol (COSOPT) 2-0.5 % ophthalmic solution, INSTILL 1 DROP INTO EACH EYE EVERY 12 HOURS, Disp: , Rfl:     flecainide (TAMBOCOR) 50 MG tablet, Take 1 tablet by mouth Every 12 (Twelve) Hours., Disp: 180 tablet, Rfl: 3    latanoprost (XALATAN) 0.005 % ophthalmic solution, Administer 1 drop to the right eye Every Night., Disp: , Rfl:     levothyroxine (SYNTHROID, LEVOTHROID) 88 MCG tablet, Take 1 tablet by mouth Daily., Disp: 90 tablet, Rfl: 1    Magnesium 500 MG tablet, Take 1 tablet by mouth Daily., Disp: , Rfl:     omeprazole (priLOSEC) 40 MG capsule, Take 1 capsule by mouth Daily., Disp: 90 capsule, Rfl: 1    polyethylene glycol (MIRALAX) 17 GM/SCOOP powder, Take 17 g by mouth Daily., Disp: , Rfl:     rosuvastatin (CRESTOR) 5 MG tablet, Take 1 tablet by mouth Daily., Disp: 90 tablet, Rfl: 3    nitroglycerin (NITROSTAT) 0.4 MG SL tablet, Place 1 tablet under the tongue Every 5 (Five) Minutes As Needed for Chest Pain. Take no more than 3 doses in 15 minutes. (Patient not taking: Reported on 3/19/2025), Disp: 30 tablet, Rfl: 0    sodium-potassium-magnesium sulfates (SUPREP) 17.5-3.13-1.6 GM/177ML solution oral solution, Take as directed (Patient not taking: Reported on 3/19/2025), Disp: 177 mL, Rfl: 0    traMADol (ULTRAM) 50 MG tablet, Take 1 tablet by mouth Every 6 (Six) Hours As Needed for Moderate Pain. (Patient not taking: Reported on 3/19/2025), Disp: 60 tablet, Rfl: 0      Immunization History   Administered Date(s) Administered    Arexvy (RSV, Adults 60+ yrs) 12/22/2023    COVID-19 (PFIZER) 12YRS+ (COMIRNATY)  "10/09/2023, 10/01/2024    COVID-19 (PFIZER) BIVALENT 12+YRS 10/06/2022    COVID-19 (PFIZER) Purple Cap Monovalent 03/06/2021, 03/06/2021, 03/27/2021, 03/27/2021, 10/04/2021, 10/04/2021    Covid-19 (Pfizer) Gray Cap Monovalent 04/01/2022    Fluad Quad 65+ 10/09/2023    Fluzone High-Dose 65+YRS 10/01/2024    Fluzone High-Dose 65+yrs 08/14/2020, 08/14/2020, 10/12/2021, 10/06/2022    Hepatitis A 04/20/2018, 04/20/2018, 10/20/2018, 10/20/2018    Influenza, Unspecified 08/14/2020    Pneumococcal Conjugate 20-Valent (PCV20) 04/19/2023    Shingrix 04/18/2022, 06/21/2022    Zoster, Unspecified 04/18/2022         Objective       Vitals:    03/19/25 0905 03/19/25 0911   BP: 173/90 173/81   Pulse: 65    Temp: 96.8 °F (36 °C)    TempSrc: Temporal    SpO2: 96%    Weight: 61.2 kg (135 lb)    Height: 147.3 cm (57.99\")       Body mass index is 28.22 kg/m².   Wt Readings from Last 3 Encounters:   03/19/25 61.2 kg (135 lb)   12/04/24 64 kg (141 lb)   11/22/24 64.7 kg (142 lb 10.2 oz)      BP Readings from Last 3 Encounters:   03/19/25 173/81   03/18/25 169/65   12/04/24 136/79             Physical Exam  Vitals reviewed.   Constitutional:       Appearance: Normal appearance. She is well-developed.   HENT:      Head: Normocephalic and atraumatic.   Eyes:      Conjunctiva/sclera: Conjunctivae normal.      Pupils: Pupils are equal, round, and reactive to light.   Cardiovascular:      Rate and Rhythm: Normal rate and regular rhythm.      Heart sounds: Normal heart sounds. No murmur heard.  Pulmonary:      Effort: Pulmonary effort is normal.      Breath sounds: Normal breath sounds. No wheezing or rhonchi.   Abdominal:      General: Bowel sounds are normal. There is no distension.      Palpations: Abdomen is soft.      Tenderness: There is no abdominal tenderness.   Skin:     General: Skin is warm and dry.   Neurological:      Mental Status: She is alert and oriented to person, place, and time.   Psychiatric:         Mood and Affect: Mood " and affect normal.         Behavior: Behavior normal.         Thought Content: Thought content normal.         Judgment: Judgment normal.         Physical Exam  Neck is tight.  No pain elicited upon palpation of the abdomen.      Result Review :   Results  Testing  EKG showed sinus rhythm, heart rate was 65.         Common labs          4/23/2024    12:24 11/22/2024    11:41   Common Labs   Glucose 90  145    BUN 13  16    Creatinine 0.95  1.07    Sodium 129  134    Potassium 4.9  4.0    Chloride 99  98    Calcium 9.6  10.0    Albumin 3.9  4.2    Total Bilirubin 0.4  0.5    Alkaline Phosphatase 85  100    AST (SGOT) 29  58    ALT (SGPT) 18  33    WBC 6.30  10.32    Hemoglobin 13.3  13.7    Hematocrit 40.9  41.1    Platelets 155  162    Total Cholesterol 148     Triglycerides 146     HDL Cholesterol 60     LDL Cholesterol  63                      Assessment and Plan        Diagnoses and all orders for this visit:    1. Right upper quadrant abdominal pain (Primary)  -     Lipase; Future  -     Amylase; Future  -     Comprehensive Metabolic Panel; Future  -     CBC (No Diff)  -     US Gallbladder; Future  -     omeprazole (priLOSEC) 40 MG capsule; Take 1 capsule by mouth Daily.  Dispense: 90 capsule; Refill: 1  -     Lipase  -     Amylase  -     Comprehensive Metabolic Panel    2. Nausea  -     Lipase; Future  -     Amylase; Future  -     Comprehensive Metabolic Panel; Future  -     CBC (No Diff)  -     US Gallbladder; Future  -     omeprazole (priLOSEC) 40 MG capsule; Take 1 capsule by mouth Daily.  Dispense: 90 capsule; Refill: 1  -     Lipase  -     Amylase  -     Comprehensive Metabolic Panel    3. Hypothyroidism, unspecified type  -     TSH+Free T4; Future  -     TSH+Free T4    4. Hyperlipidemia LDL goal <100  -     Lipid Panel    5. Chronic idiopathic constipation    6. Immunity to measles, mumps, and rubella determined by serologic test  -     Measles / Mumps / Rubella Immunity        Assessment & Plan  1. Right  upper quadrant pain.  The etiology of the pain is uncertain, but it is not cardiac in nature. She is advised to avoid fatty foods to prevent exacerbation of symptoms. An ultrasound of the gallbladder will be scheduled at [facility]. The dosage of omeprazole will be increased to 40 mg until further evaluation can be conducted. She can take two 20 mg tablets until the new prescription is filled at [facility].    2. Constipation.  She is advised to continue her current regimen of MiraLAX and prune juice. Over-the-counter magnesium citrate may be used if necessary to facilitate bowel movements.    3. Neck pain.  The pain is not cardiac in nature as it is tender to touch on the left side.    4. Health maintenance.  Laboratory tests will be conducted today to assess thyroid function and cholesterol levels.    Return for Pending results, Keep follow up as scheduled.    Patient was given instructions and counseling regarding her condition or for health maintenance advice. Please see specific information pulled into the AVS if appropriate.     LUCAS Nunes    Patient or patient representative verbalized consent for the use of Ambient Listening during the visit with  LUCAS Nunes for chart documentation. 3/19/2025  10:48 EDT

## 2025-03-20 LAB
MEV IGG SER IA-ACNC: >300 AU/ML
MUV IGG SER IA-ACNC: >300 AU/ML
RUBV IGG SERPL IA-ACNC: 13.7 INDEX

## 2025-03-24 ENCOUNTER — LAB (OUTPATIENT)
Dept: LAB | Facility: HOSPITAL | Age: 75
End: 2025-03-24
Payer: MEDICARE

## 2025-03-24 DIAGNOSIS — E03.9 HYPOTHYROIDISM, UNSPECIFIED TYPE: ICD-10-CM

## 2025-03-24 DIAGNOSIS — R10.11 RIGHT UPPER QUADRANT ABDOMINAL PAIN: Primary | ICD-10-CM

## 2025-03-24 LAB
T4 FREE SERPL-MCNC: 1.84 NG/DL (ref 0.92–1.68)
TSH SERPL DL<=0.05 MIU/L-ACNC: 0.07 UIU/ML (ref 0.27–4.2)

## 2025-03-24 PROCEDURE — 84443 ASSAY THYROID STIM HORMONE: CPT

## 2025-03-24 PROCEDURE — 36415 COLL VENOUS BLD VENIPUNCTURE: CPT

## 2025-03-24 PROCEDURE — 84439 ASSAY OF FREE THYROXINE: CPT

## 2025-03-25 ENCOUNTER — TELEPHONE (OUTPATIENT)
Dept: FAMILY MEDICINE CLINIC | Facility: CLINIC | Age: 75
End: 2025-03-25
Payer: MEDICARE

## 2025-03-25 NOTE — TELEPHONE ENCOUNTER
Caller: Gisell Varghese    Relationship: Self    Best call back number:   Telephone Information:   Mobile [phone]     What was the call regarding: REQUESTING UPDATE ON HIDA SCAN REFERRAL

## 2025-03-25 NOTE — TELEPHONE ENCOUNTER
Pt notified that order was just placed yesterday and it could take a couple of days for scheduling to call her to make an appointment.

## 2025-03-25 NOTE — TELEPHONE ENCOUNTER
Caller: Gisell Varghese    Relationship: Self    Best call back number:   Telephone Information:   Mobile [phone]        Who are you requesting to speak with (clinical staff, provider,  specific staff member): CLINICAL    What was the call regarding: WOULD LIKE TO KNOW RESULTS OF LAB WORK DRAWN YESTERDAY

## 2025-03-26 ENCOUNTER — TELEPHONE (OUTPATIENT)
Dept: FAMILY MEDICINE CLINIC | Facility: CLINIC | Age: 75
End: 2025-03-26
Payer: MEDICARE

## 2025-03-26 DIAGNOSIS — R10.11 RIGHT UPPER QUADRANT ABDOMINAL PAIN: Primary | ICD-10-CM

## 2025-03-26 DIAGNOSIS — R74.8 ELEVATED AMYLASE: ICD-10-CM

## 2025-03-26 DIAGNOSIS — R74.8 ELEVATED LIVER ENZYMES: ICD-10-CM

## 2025-03-26 NOTE — TELEPHONE ENCOUNTER
Name: Gisell Varghese    Relationship: Self    Best Callback Number: [phone]    HUB PROVIDED THE RELAY MESSAGE FROM THE OFFICE   PATIENT HAS FURTHER QUESTIONS AND WOULD LIKE A CALL BACK    ADDITIONAL INFORMATION:HUB TO RELAY WAS READ TO PATIENT VERBATIM. SHE WOULD LIKE A CALL BACK WITH HER RESULTS.

## 2025-03-26 NOTE — TELEPHONE ENCOUNTER
Patient called in regards to her elevated amylase levels. She wants to know why she is having a HIDA scan done to check the function of her gallbladder if the labs that are abnormal have to do with her pancreas- she feels additional testing should be done on her pancreas since her amylase has been elevated instead of her gallbladder. Please advise.

## 2025-04-16 ENCOUNTER — TELEPHONE (OUTPATIENT)
Dept: FAMILY MEDICINE CLINIC | Facility: CLINIC | Age: 75
End: 2025-04-16
Payer: MEDICARE

## 2025-04-16 NOTE — TELEPHONE ENCOUNTER
Called and notified pt that all the medications prescribed by our office were sent on 3/19/25 and should be on file at her pharmacy.

## 2025-04-16 NOTE — TELEPHONE ENCOUNTER
Caller: Gary Varghesea Jean    Relationship: Self    Best call back number: [phone]    Requested Prescriptions:   Requested Prescriptions      No prescriptions requested or ordered in this encounter        [facility] where [facility] sent[facility] [address] - [phone]  - [phone] FX     Last office visit with prescribing clinician: 3/19/2025   Last telemedicine visit with prescribing clinician: Visit date not found   Next office visit with prescribing clinician: 4/23/2025     Additional details provided by patient: PATIENT IS REQUESTING REFILLS ON ALL OF HER CURRENT MEDICATIONS THAT WERE DISCUSSED AT HER LAST APPOINTMENT SINCE SHE HAD HER LABS DONE ALREADY   SHE CANCELLED HER APPOINTMENT FOR 04/23/2025 SINCE SHE WAS SEEN 03/19/205   PLEASE CONTACT IF THERE ARE ANY QUESTIONS     Does the patient have less than a 3 day supply:  [] Yes  [x] No    Would you like a call back once the refill request has been completed: [] Yes [x] No    If the office needs to give you a call back, can they leave a voicemail: [] Yes [x] No    Hamida Beck Rep   04/16/25 12:27 EDT

## 2025-04-25 ENCOUNTER — TELEPHONE (OUTPATIENT)
Dept: SURGERY | Facility: CLINIC | Age: 75
End: 2025-04-25
Payer: MEDICARE

## 2025-04-25 NOTE — TELEPHONE ENCOUNTER
I called and spoke with pt. She r/s to 5/15/25 with a 12pm arrival time. Spoke with hui in surgery scheduling. Uploaded her bowel prep in her MyChart. Reminded her to stop Elliquis 2 days prior to colonoscopy.

## 2025-04-25 NOTE — TELEPHONE ENCOUNTER
Caller: Gisell Varghese    Relationship to patient: Self    Best call back number: [phone]    Type of visit: COLONOSCOPY    Additional notes: PATIENT WOULD LIKE TO RESCHEDULE HER COLONOSCOPY DUE TO  IN HOSPITAL. EVERYTHING IS GOOD AT HOME AND NOW IS THE TIME FOR HER TO COMPLETE IT.

## 2025-05-07 NOTE — PRE-PROCEDURE INSTRUCTIONS
No voicemail available:  Reminded of arrival time at 1200, Entrance C of the [facility]. Instructed to bring or have a  over the age of 18 set up to drive you home the day of procedure.    Instructed on clear liquid diet the day before, nothing red or purple. Call with any questions about the prep or if in need of the prep.  Reminded them not to eat or drink anything am of procedure unless its a sip of water with medications. Hold eliquis 2 days prior to procedure or as instructed by office.

## 2025-05-09 NOTE — PRE-PROCEDURE INSTRUCTIONS
PAT call attempted.  Mailbox not set up.  Unable to leave message.  Instructed by Dr. Perez to hold Eliquis for 2 days.  No clearance requested per LUCAS Kimble.

## 2025-05-13 ENCOUNTER — LAB (OUTPATIENT)
Dept: LAB | Facility: HOSPITAL | Age: 75
End: 2025-05-13
Payer: MEDICARE

## 2025-05-13 DIAGNOSIS — E03.9 HYPOTHYROIDISM, UNSPECIFIED TYPE: ICD-10-CM

## 2025-05-13 LAB
T4 FREE SERPL-MCNC: 1.58 NG/DL (ref 0.92–1.68)
TSH SERPL DL<=0.05 MIU/L-ACNC: 0.12 UIU/ML (ref 0.27–4.2)

## 2025-05-13 PROCEDURE — 84443 ASSAY THYROID STIM HORMONE: CPT

## 2025-05-13 PROCEDURE — 84439 ASSAY OF FREE THYROXINE: CPT

## 2025-05-13 PROCEDURE — 36415 COLL VENOUS BLD VENIPUNCTURE: CPT

## 2025-05-14 ENCOUNTER — ANESTHESIA EVENT (OUTPATIENT)
Dept: GASTROENTEROLOGY | Facility: HOSPITAL | Age: 75
End: 2025-05-14
Payer: MEDICARE

## 2025-05-14 NOTE — ANESTHESIA PREPROCEDURE EVALUATION
Anesthesia Evaluation     Patient summary reviewed   NPO Solid Status: > 8 hours  NPO Liquid Status: > 2 hours           Airway   Mallampati: III  TM distance: <3 FB  Neck ROM: full  Possible difficult intubation  Dental      Pulmonary     breath sounds clear to auscultation  (+) ,sleep apnea  Cardiovascular   Exercise tolerance: good (4-7 METS)    ECG reviewed  Rhythm: regular  Rate: normal    (+) hypertension well controlled less than 2 medications, dysrhythmias (taking eliquis) Atrial Fib, hyperlipidemia    ROS comment: 7/3/23 ECHO    Interpretation Summary       ·  Left ventricular systolic function is normal. Left ventricular ejection fraction appears to be 56 - 60%.  ·  Left ventricular diastolic function was normal.  ·  No regional wall motion abnormality  ·  Estimated right ventricular systolic pressure from tricuspid regurgitation is normal (<35 mmHg).  ·  No significant valvular lesions seen    HEART RATE=65  bpm  RR Tggoesgh=835  ms  OK Xxnmlqet=460  ms  P Horizontal Axis=11  deg  P Front Axis=51  deg  QRSD Interval=93  ms  QT Lfoelefl=123  ms  MLrT=309  ms  QRS Axis=52  deg  T Wave Axis=-47  deg  - ABNORMAL ECG -  Sinus rhythm  Nonspecific  repol abnormality, diffuse leads  When compared with ECG of 23-Jun-2023 20:22:09,  Significant repolarization change  Electronically Signed By: Gurinder Lua ([facility]) 2025-03-25 11:38:50  Date and Time of Study:2025-03-18 19:00:54            Neuro/Psych  (+) numbness, psychiatric history (Psychophysiological insomnia)  GI/Hepatic/Renal/Endo    (+) GERD well controlled, liver disease fatty liver disease, thyroid problem hypothyroidism    Musculoskeletal     (+) back pain (Chronic bilateral low back pain with bilateral sciatica), neck pain  Abdominal    Substance History - negative use     OB/GYN          Other - negative ROS       ROS/Med Hx Other: Follows up with Dr Mitchell, cardiology every year. Next appointment is next week  Last dose Eliquis 5/13/25  NSR 60's on monitor  today  Sip of water at 1045                Anesthesia Plan    ASA 3     general   total IV anesthesia  (Total IV Anesthesia    Patient understands anesthesia not responsible for dental damage.    Discussed risks with pt including aspiration, allergic reactions, apnea, advanced airway placement. Pt verbalized understanding. All questions answered.   )  intravenous induction     Anesthetic plan, risks, benefits, and alternatives have been provided, discussed and informed consent has been obtained with: patient.  Pre-procedure education provided  Plan discussed with CRNA.    CODE STATUS:

## 2025-05-15 ENCOUNTER — APPOINTMENT (OUTPATIENT)
Dept: GENERAL RADIOLOGY | Facility: HOSPITAL | Age: 75
End: 2025-05-15
Payer: MEDICARE

## 2025-05-15 ENCOUNTER — ANESTHESIA (OUTPATIENT)
Dept: GASTROENTEROLOGY | Facility: HOSPITAL | Age: 75
End: 2025-05-15
Payer: MEDICARE

## 2025-05-15 ENCOUNTER — HOSPITAL ENCOUNTER (OUTPATIENT)
Facility: HOSPITAL | Age: 75
Setting detail: HOSPITAL OUTPATIENT SURGERY
Discharge: HOME | End: 2025-05-15
Attending: SURGERY | Admitting: SURGERY
Payer: MEDICARE

## 2025-05-15 VITALS
WEIGHT: 131.17 LBS | SYSTOLIC BLOOD PRESSURE: 140 MMHG | DIASTOLIC BLOOD PRESSURE: 65 MMHG | RESPIRATION RATE: 22 BRPM | HEART RATE: 57 BPM | OXYGEN SATURATION: 96 % | BODY MASS INDEX: 27.42 KG/M2 | TEMPERATURE: 97.7 F

## 2025-05-15 DIAGNOSIS — Z86.0100 HISTORY OF COLON POLYPS: ICD-10-CM

## 2025-05-15 PROCEDURE — 25010000002 PROPOFOL 10 MG/ML EMULSION: Performed by: NURSE ANESTHETIST, CERTIFIED REGISTERED

## 2025-05-15 PROCEDURE — 74018 RADEX ABDOMEN 1 VIEW: CPT

## 2025-05-15 PROCEDURE — 25010000002 LIDOCAINE PF 2% 2 % SOLUTION: Performed by: NURSE ANESTHETIST, CERTIFIED REGISTERED

## 2025-05-15 PROCEDURE — 88305 TISSUE EXAM BY PATHOLOGIST: CPT | Performed by: SURGERY

## 2025-05-15 PROCEDURE — 25810000003 LACTATED RINGERS PER 1000 ML: Performed by: NURSE ANESTHETIST, CERTIFIED REGISTERED

## 2025-05-15 DEVICE — DEV CLIP ENDO RESOLUTION360 CONTRL ROT 235CM: Type: IMPLANTABLE DEVICE | Site: ASCENDING COLON | Status: FUNCTIONAL

## 2025-05-15 RX ORDER — SODIUM CHLORIDE, SODIUM LACTATE, POTASSIUM CHLORIDE, CALCIUM CHLORIDE 600; 310; 30; 20 MG/100ML; MG/100ML; MG/100ML; MG/100ML
30 INJECTION, SOLUTION INTRAVENOUS CONTINUOUS
Status: DISCONTINUED | OUTPATIENT
Start: 2025-05-15 | End: 2025-05-15 | Stop reason: HOSPADM

## 2025-05-15 RX ORDER — PROPOFOL 10 MG/ML
VIAL (ML) INTRAVENOUS AS NEEDED
Status: DISCONTINUED | OUTPATIENT
Start: 2025-05-15 | End: 2025-05-15 | Stop reason: SURG

## 2025-05-15 RX ORDER — LIDOCAINE HYDROCHLORIDE 20 MG/ML
INJECTION, SOLUTION EPIDURAL; INFILTRATION; INTRACAUDAL; PERINEURAL AS NEEDED
Status: DISCONTINUED | OUTPATIENT
Start: 2025-05-15 | End: 2025-05-15 | Stop reason: SURG

## 2025-05-15 RX ADMIN — SODIUM CHLORIDE, POTASSIUM CHLORIDE, SODIUM LACTATE AND CALCIUM CHLORIDE 30 ML/HR: 600; 310; 30; 20 INJECTION, SOLUTION INTRAVENOUS at 11:07

## 2025-05-15 RX ADMIN — PROPOFOL 30 MG: 10 INJECTION, EMULSION INTRAVENOUS at 11:43

## 2025-05-15 RX ADMIN — PROPOFOL 175 MCG/KG/MIN: 10 INJECTION, EMULSION INTRAVENOUS at 11:39

## 2025-05-15 RX ADMIN — LIDOCAINE HYDROCHLORIDE 60 MG: 20 INJECTION, SOLUTION EPIDURAL; INFILTRATION; INTRACAUDAL; PERINEURAL at 11:38

## 2025-05-15 RX ADMIN — PROPOFOL 70 MG: 10 INJECTION, EMULSION INTRAVENOUS at 11:38

## 2025-05-15 NOTE — ANESTHESIA POSTPROCEDURE EVALUATION
Patient: Gisell Varghese    Procedure Summary       Date: 05/15/25 Room / Location: [facility] ENDOSCOPY 1 / [facility] END[facility]COPY    Anesthesia Start: 1135 Anesthesia Stop: 1155    Procedure: COLONOSCOPY with polypectomy, clip x1 Diagnosis:       History of colon polyps      (History of colon polyps [Z86.0100])    Surgeons: Khalif Perez MD Provider: Taina Glez CRNA    Anesthesia Type: general ASA Status: 3            Anesthesia Type: general    Vitals  Vitals Value Taken Time   /65 05/15/25 13:01   Temp 36.5 °C (97.7 °F) 05/15/25 12:29   Pulse 56 05/15/25 13:03   Resp 22 05/15/25 12:29   SpO2 96 % 05/15/25 13:03   Vitals shown include unfiled device data.        Post Anesthesia Care and Evaluation    Post-procedure mental status: acceptable.  Pain management: satisfactory to patient    Airway patency: patent  Anesthetic complications: No anesthetic complications    Cardiovascular status: acceptable  Respiratory status: acceptable    Comments: Per chart review

## 2025-05-15 NOTE — H&P
General Surgery/Colorectal Surgery Note    Patient Name:  Gisell Varghese  YOB: 1950  [id number]    Referring Provider: Khalif Perez, *      Patient Care Team:  Alexa Curry APRN as PCP - General (Nurse Practitioner)  Khalif Perez MD as Consulting Physician (General Surgery)    Subjective .     History of present illness:    HPI   surveillance diagnostic colonoscopy. History of colon polyps 2019 removed by Dr. Odom. History of Eliquis use. No family history of colorectal cancer. Asymptomatic with no weight loss, abdominal pain, blood in her stool. No recent chest pain.     History:  Past Medical History:   Diagnosis Date    Essential hypertension 2020    Fatty liver disease, nonalcoholic     Glaucoma     Hemorrhoid     Hyperlipidemia LDL goal <100 2020    Hypothyroidism     Ingrowing toenail     Muscle cramps     Paroxysmal atrial fibrillation     Spinal stenosis of cervical region     Trigger middle finger of left hand 2015       Past Surgical History:   Procedure Laterality Date    BACK SURGERY Right 2009    L5-S1 laminectomy(Dr. Bains)    CATARACT EXTRACTION Bilateral      SECTION  .    COLONOSCOPY      HAND SURGERY      HYSTERECTOMY         Family History   Problem Relation Age of Onset    Arthritis Mother     Osteoporosis Mother     Arthritis Father     Arthritis Sister     Arthritis Brother     Heart disease Brother         MI    Stroke Other        Social History     Tobacco Use    Smoking status: Never    Smokeless tobacco: Never   Vaping Use    Vaping status: Never Used   Substance Use Topics    Alcohol use: Never    Drug use: Never       Review of Systems: See HPI    Review of Systems            Medications Prior to Admission   Medication Sig Dispense Refill Last Dose/Taking    atenolol (TENORMIN) 50 MG tablet Take 1 tablet by mouth twice daily 180 tablet 1 5/15/2025    flecainide (TAMBOCOR) 50 MG tablet Take 1 tablet  by mouth Every 12 (Twelve) Hours. 180 tablet 3 5/15/2025    levothyroxine (SYNTHROID, LEVOTHROID) 88 MCG tablet Take 1 tablet by mouth Daily. Except omit on Sundays and 0.5 tablet on Wednesdays 60 tablet 0 5/15/2025    omeprazole (priLOSEC) 40 MG capsule Take 1 capsule by mouth Daily. 90 capsule 1 5/14/2025    rosuvastatin (CRESTOR) 5 MG tablet Take 1 tablet by mouth Daily. 90 tablet 3 5/14/2025    apixaban (Eliquis) 5 MG tablet tablet MYRIAM GEOVANNA 180 tablet 3 5/13/2025    clobetasol (TEMOVATE) 0.05 % cream APPLY 1 APPLICATION TOPICALLY TO THE APPROPRIATE AREA AS DIRECTED 2 TIMES A DAY AS NEEDED (RASH). 45 g 0     docusate sodium (COLACE) 100 MG capsule Take 2 capsules by mouth Daily.       dorzolamide-timolol (COSOPT) 2-0.5 % ophthalmic solution INSTILL 1 DROP INTO EACH EYE EVERY 12 HOURS       latanoprost (XALATAN) 0.005 % ophthalmic solution Administer 1 drop to the right eye Every Night.       Magnesium 500 MG tablet Take 1 tablet by mouth Daily.       nitroglycerin (NITROSTAT) 0.4 MG SL tablet Place 1 tablet under the tongue Every 5 (Five) Minutes As Needed for Chest Pain. Take no more than 3 doses in 15 minutes. (Patient not taking: Reported on 3/19/2025) 30 tablet 0     polyethylene glycol (MIRALAX) 17 GM/SCOOP powder Take 17 g by mouth Daily.       traMADol (ULTRAM) 50 MG tablet Take 1 tablet by mouth Every 6 (Six) Hours As Needed for Moderate Pain. (Patient not taking: Reported on 3/19/2025) 60 tablet 0          Home Meds:      Prior to Admission medications    Medication Sig Start Date End Date Taking? Authorizing Provider   atenolol (TENORMIN) 50 MG tablet Take 1 tablet by mouth twice daily 3/18/25  Yes Alexa Curry APRN   flecainide (TAMBOCOR) 50 MG tablet Take 1 tablet by mouth Every 12 (Twelve) Hours. 5/13/24  Yes JOHN Mitchell MD   levothyroxine (SYNTHROID, LEVOTHROID) 88 MCG tablet Take 1 tablet by mouth Daily. Except omit on Sundays and 0.5 tablet on Wednesdays 5/14/25  Yes Alexa Curry,  LUCAS   omeprazole (priLOSEC) 40 MG capsule Take 1 capsule by mouth Daily. 3/19/25  Yes Alexa Curry APRN   rosuvastatin (CRESTOR) 5 MG tablet Take 1 tablet by mouth Daily. 5/17/24  Yes JOHN Mitchell MD   apixaban (Eliquis) 5 MG tablet tablet MYRIAM AUSTIN 1/27/23   Diamond Blair APRN   clobetasol (TEMOVATE) 0.05 % cream APPLY 1 APPLICATION TOPICALLY TO THE APPROPRIATE AREA AS DIRECTED 2 TIMES A DAY AS NEEDED (RASH). 8/14/23   Alexa Curry APRN   docusate sodium (COLACE) 100 MG capsule Take 2 capsules by mouth Daily.    Axel Beatty MD   dorzolamide-timolol (COSOPT) 2-0.5 % ophthalmic solution INSTILL 1 DROP INTO EACH EYE EVERY 12 HOURS 1/16/25   Axel Beatty MD   latanoprost (XALATAN) 0.005 % ophthalmic solution Administer 1 drop to the right eye Every Night.    Axel Beatty MD   Magnesium 500 MG tablet Take 1 tablet by mouth Daily.    Axel Beatty MD   nitroglycerin (NITROSTAT) 0.4 MG SL tablet Place 1 tablet under the tongue Every 5 (Five) Minutes As Needed for Chest Pain. Take no more than 3 doses in 15 minutes.  Patient not taking: Reported on 3/19/2025 10/8/21   Timothy Shaikh MD   polyethylene glycol (MIRALAX) 17 GM/SCOOP powder Take 17 g by mouth Daily.    ProviderAxel MD   traMADol (ULTRAM) 50 MG tablet Take 1 tablet by mouth Every 6 (Six) Hours As Needed for Moderate Pain.  Patient not taking: Reported on 3/19/2025 12/4/24   Isabela Roberts APRN   sodium-potassium-magnesium sulfates (SUPREP) 17.5-3.13-1.6 GM/177ML solution oral solution Take as directed  Patient not taking: Reported on 3/19/2025 10/30/24 5/15/25  Khalif Perez MD            Allergies:  Cephalexin, Ciprofloxacin, Hydrocodone-acetaminophen, Hydrocodone-ibuprofen, Penicillins, and Adhesive tape      Objective     Vital Signs   Temp:  [98.6 °F (37 °C)] 98.6 °F (37 °C)  Heart Rate:  [68] 68  Resp:  [19] 19  BP: (161)/(81) 161/81    Physical Exam:     Physical  Exam    NAD, A/O x 4, normal circulation, normal respiration      Result Review :Labs  Result Review  Imaging  Med Tab  Media    Assessment & Plan     There are no diagnoses linked to this encounter.       Risks including bleeding, perforation, pain, infection, missed polyp(s). Benefits, and alternatives of Colonoscopy discussed with patient.  All questions answered.  Consent verified.         Khalif Perez MD  05/15/25 11:13 EDT

## 2025-05-16 LAB
CYTO UR: NORMAL
LAB AP CASE REPORT: NORMAL
LAB AP CLINICAL INFORMATION: NORMAL
PATH REPORT.FINAL DX SPEC: NORMAL
PATH REPORT.GROSS SPEC: NORMAL

## 2025-05-23 ENCOUNTER — TELEPHONE (OUTPATIENT)
Dept: FAMILY MEDICINE CLINIC | Facility: CLINIC | Age: 75
End: 2025-05-23

## 2025-05-23 NOTE — TELEPHONE ENCOUNTER
Called and spoke with Gisell and have her scheduled with Dr. Valladares in October per patient's request

## 2025-05-27 RX ORDER — FLECAINIDE ACETATE 50 MG/1
50 TABLET ORAL EVERY 12 HOURS
Qty: 180 TABLET | Refills: 0 | OUTPATIENT
Start: 2025-05-27

## 2025-05-29 RX ORDER — FLECAINIDE ACETATE 50 MG/1
50 TABLET ORAL EVERY 12 HOURS
Qty: 180 TABLET | Refills: 3 | Status: SHIPPED | OUTPATIENT
Start: 2025-05-29

## 2025-06-09 RX ORDER — ROSUVASTATIN CALCIUM 5 MG/1
5 TABLET, COATED ORAL DAILY
Qty: 90 TABLET | Refills: 3 | Status: SHIPPED | OUTPATIENT
Start: 2025-06-09

## 2025-06-09 NOTE — TELEPHONE ENCOUNTER
Rx Refill Note  Requested Prescriptions     Pending Prescriptions Disp Refills    rosuvastatin (CRESTOR) 5 MG tablet [Pharmacy Med Name: Rosuvastatin Calcium 5 MG Oral Tablet] 90 tablet 0     Sig: Take 1 tablet by mouth once daily        LAST OFFICE VISIT:  5/17/2024     NEXT OFFICE VISIT:  6/12/2025     Does the medication requests match the last office note:    [x] Yes   [] No    Does this refill request meet protocol details for MA to approve:     [x] Yes   [] No   [] No Protocols Provided

## 2025-06-12 ENCOUNTER — OFFICE VISIT (OUTPATIENT)
Dept: CARDIOLOGY | Facility: CLINIC | Age: 75
End: 2025-06-12
Payer: MEDICARE

## 2025-06-12 VITALS
HEIGHT: 58 IN | DIASTOLIC BLOOD PRESSURE: 63 MMHG | SYSTOLIC BLOOD PRESSURE: 167 MMHG | HEART RATE: 65 BPM | WEIGHT: 131 LBS | BODY MASS INDEX: 27.5 KG/M2

## 2025-06-12 DIAGNOSIS — I10 HYPERTENSION, ESSENTIAL: ICD-10-CM

## 2025-06-12 DIAGNOSIS — E78.2 HYPERLIPEMIA, MIXED: ICD-10-CM

## 2025-06-12 DIAGNOSIS — I48.0 PAROXYSMAL ATRIAL FIBRILLATION: Primary | ICD-10-CM

## 2025-06-12 RX ORDER — SULFAMETHOXAZOLE AND TRIMETHOPRIM 800; 160 MG/1; MG/1
1 TABLET ORAL 2 TIMES DAILY
Qty: 14 TABLET | Refills: 0 | Status: SHIPPED | OUTPATIENT
Start: 2025-06-12 | End: 2025-06-19

## 2025-06-12 RX ORDER — AMLODIPINE BESYLATE 2.5 MG/1
2.5 TABLET ORAL DAILY
Qty: 30 TABLET | Refills: 11 | Status: SHIPPED | OUTPATIENT
Start: 2025-06-12

## 2025-06-12 NOTE — PROGRESS NOTES
Chief Complaint  1 yr follow up  and Atrial Fibrillation    Subjective            Gisell Varghese presents to [facility] CARDIOLOGY  History of Present Illness    Gisell is here for follow-up evaluation and management of paroxysmal atrial fibrillation, hypertension, hyperlipidemia.  She is currently battling UTI symptoms.  She is requesting an antibiotic.  She denies chest pain, excessive shortness of breath, or palpitations.  Her blood pressure monitor will occasionally tell her that her heart rhythm is irregular but this is not often.  Her blood pressure has been running a little high lately.    PMH  Past Medical History:   Diagnosis Date    Essential hypertension 2020    Fatty liver disease, nonalcoholic     Glaucoma     Hemorrhoid     Hyperlipidemia LDL goal <100 2020    Hypothyroidism     Ingrowing toenail     Muscle cramps     Paroxysmal atrial fibrillation     Spinal stenosis of cervical region     Trigger middle finger of left hand 2015         SURGICALHX  Past Surgical History:   Procedure Laterality Date    BACK SURGERY Right 2009    L5-S1 laminectomy(Dr. Bains)    CATARACT EXTRACTION Bilateral      SECTION  .    COLONOSCOPY      COLONOSCOPY N/A 5/15/2025    Procedure: COLONOSCOPY with polypectomy, clip x1;  Surgeon: Khalif Perez MD;  Location: [facility] ENDOSCOPY;  Service: General;  Laterality: N/A;  colon polyp    HAND SURGERY      HYSTERECTOMY          SOC  Social History     Socioeconomic History    Marital status:    Tobacco Use    Smoking status: Never    Smokeless tobacco: Never   Vaping Use    Vaping status: Never Used   Substance and Sexual Activity    Alcohol use: Never    Drug use: Never    Sexual activity: Defer         FAMHX  Family History   Problem Relation Age of Onset    Arthritis Mother     Osteoporosis Mother     Arthritis Father     Arthritis Sister     Arthritis Brother     Heart disease Brother         MI     Stroke Other           ALLERGY  Allergies   Allergen Reactions    Cephalexin Hives    Ciprofloxacin Hives    Hydrocodone-Acetaminophen Mental Status Change    Hydrocodone-Ibuprofen Mental Status Change    Penicillins Hives    Adhesive Tape Rash        MEDSCURRENT    Current Outpatient Medications:     apixaban (Eliquis) 5 MG tablet tablet, MYRIAM GEOVANNA, Disp: 180 tablet, Rfl: 3    atenolol (TENORMIN) 50 MG tablet, Take 1 tablet by mouth twice daily, Disp: 180 tablet, Rfl: 1    clobetasol (TEMOVATE) 0.05 % cream, APPLY 1 APPLICATION TOPICALLY TO THE APPROPRIATE AREA AS DIRECTED 2 TIMES A DAY AS NEEDED (RASH)., Disp: 45 g, Rfl: 0    docusate sodium (COLACE) 100 MG capsule, Take 2 capsules by mouth Daily., Disp: , Rfl:     dorzolamide-timolol (COSOPT) 2-0.5 % ophthalmic solution, INSTILL 1 DROP INTO EACH EYE EVERY 12 HOURS, Disp: , Rfl:     flecainide (TAMBOCOR) 50 MG tablet, TAKE 1 TABLET BY MOUTH EVERY 12 HOURS, Disp: 180 tablet, Rfl: 3    latanoprost (XALATAN) 0.005 % ophthalmic solution, Administer 1 drop to the right eye Every Night., Disp: , Rfl:     levothyroxine (SYNTHROID, LEVOTHROID) 88 MCG tablet, Take 1 tablet by mouth Daily. Except omit on Sundays and 0.5 tablet on Wednesdays, Disp: 60 tablet, Rfl: 0    Magnesium 500 MG tablet, Take 1 tablet by mouth Daily., Disp: , Rfl:     omeprazole (priLOSEC) 40 MG capsule, Take 1 capsule by mouth Daily., Disp: 90 capsule, Rfl: 1    polyethylene glycol (MIRALAX) 17 GM/SCOOP powder, Take 17 g by mouth Daily., Disp: , Rfl:     rosuvastatin (CRESTOR) 5 MG tablet, Take 1 tablet by mouth once daily, Disp: 90 tablet, Rfl: 3    amLODIPine (NORVASC) 2.5 MG tablet, Take 1 tablet by mouth Daily., Disp: 30 tablet, Rfl: 11    sulfamethoxazole-trimethoprim (Bactrim DS) 800-160 MG per tablet, Take 1 tablet by mouth 2 (Two) Times a Day for 7 days., Disp: 14 tablet, Rfl: 0      Review of Systems   Constitutional: Positive for malaise/fatigue.   Cardiovascular: Negative.  Negative for  "chest pain.   Respiratory: Negative.          Objective     /63   Pulse 65   Ht 147.3 cm (57.99\")   Wt 59.4 kg (131 lb)   BMI 27.39 kg/m²       General Appearance:   well developed  well nourished  HENT:   oropharynx moist  lips not cyanotic  Neck:  thyroid not enlarged  supple  Respiratory:  no respiratory distress  normal breath sounds  no rales  Cardiovascular:  no jugular venous distention  regular rhythm  apical impulse normal  S1 normal, S2 normal  no S3, no S4   no murmur  no rub, no thrill  carotid pulses normal; no bruit  pedal pulses normal  lower extremity edema: none    Musculoskeletal:  no clubbing of fingers.   normocephalic, head atraumatic  Skin:   warm, dry  Psychiatric:  judgement and insight appropriate  normal mood and affect      Result Review :     The following data was reviewed by: LUCAS Hitchcock on 06/12/2025:    CMP          11/22/2024    11:41 3/19/2025    09:48   CMP   Glucose 145  101    BUN 16  15    Creatinine 1.07  1.16    EGFR 55.3  50.2    Sodium 134  138    Potassium 4.0  4.1    Chloride 98  103    Calcium 10.0  10.2    Total Protein 8.0  7.8    Albumin 4.2  4.1    Globulin 3.8  3.7    Total Bilirubin 0.5  0.4    Alkaline Phosphatase 100  105    AST (SGOT) 58  23    ALT (SGPT) 33  15    Albumin/Globulin Ratio 1.1  1.1    BUN/Creatinine Ratio 15.0  12.9    Anion Gap 13.1  12.1      CBC          11/22/2024    11:41 3/19/2025    09:48   CBC   WBC 10.32  7.68    RBC 4.28  4.09    Hemoglobin 13.7  12.5    Hematocrit 41.1  38.9    MCV 96.0  95.1    MCH 32.0  30.6    MCHC 33.3  32.1    RDW 13.2  12.4    Platelets 162  158      Lipid Panel          3/19/2025    09:48   Lipid Panel   Total Cholesterol 156    Triglycerides 126    HDL Cholesterol 53    VLDL Cholesterol 22    LDL Cholesterol  81    LDL/HDL Ratio 1.47      TSH          3/19/2025    09:48 3/24/2025    13:38 5/13/2025    08:41   TSH   TSH 0.066  0.069  0.116             Javed Varghese  " reports that she has never smoked. She has never used smokeless tobacco. I have educated her on the risk of diseases from using tobacco products such as cancer, COPD, and heart disease.                   Assessment and Plan        ASSESSMENT:  Encounter Diagnoses   Name Primary?    Paroxysmal atrial fibrillation Yes    Hypertension, essential     Hyperlipemia, mixed          PLAN:    1.  Paroxysmal atrial fibrillation, well-controlled with flecainide.  Continue as well as beta-blocker and anticoagulation.  2.  Essential hypertension, uncontrolled.  Start low-dose amlodipine 2.5 mg daily.  Continue atenolol.  She will send me a message if blood pressures consistently greater then 140/90 with these adjustments.  3.  Mixed hyperlipidemia stable on statin therapy, continue.  4.  Antibiotics sent for urinary symptoms.  Encouraged her to follow-up with primary care or urgent care if symptoms continue.    Follow-up annually.    Patient was given instructions and counseling regarding her condition or for health maintenance advice. Please see specific information pulled into the AVS if appropriate.           Diamond Blair, LUCAS   6/12/2025  14:53 EDT

## 2025-06-17 ENCOUNTER — TELEPHONE (OUTPATIENT)
Dept: SURGERY | Facility: CLINIC | Age: 75
End: 2025-06-17
Payer: MEDICARE

## 2025-06-17 NOTE — TELEPHONE ENCOUNTER
----- Message -----  From: Davis, April, APRN  Sent: 6/12/2025  11:28 AM EDT  To: Marlys Jauregui    5 year colon recall. Mb    05/15/2030    Care gap and recall placed.

## 2025-06-18 ENCOUNTER — OFFICE VISIT (OUTPATIENT)
Dept: SURGERY | Facility: CLINIC | Age: 75
End: 2025-06-18
Payer: MEDICARE

## 2025-06-18 VITALS — HEIGHT: 58 IN | BODY MASS INDEX: 27.5 KG/M2 | WEIGHT: 131 LBS

## 2025-06-18 DIAGNOSIS — D36.9 TUBULAR ADENOMA: ICD-10-CM

## 2025-06-18 DIAGNOSIS — K64.4 EXTERNAL HEMORRHOIDS: ICD-10-CM

## 2025-06-18 DIAGNOSIS — Z98.890 S/P COLONOSCOPY WITH POLYPECTOMY: Primary | ICD-10-CM

## 2025-06-18 RX ORDER — HYDROCORTISONE 25 MG/G
CREAM TOPICAL
Qty: 28 G | Refills: 0 | Status: SHIPPED | OUTPATIENT
Start: 2025-06-18 | End: 2026-06-18

## 2025-06-18 RX ORDER — CHLORHEXIDINE GLUCONATE ORAL RINSE 1.2 MG/ML
SOLUTION DENTAL
COMMUNITY
Start: 2025-05-22
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
## 2025-06-24 ENCOUNTER — LAB (OUTPATIENT)
Dept: LAB | Facility: HOSPITAL | Age: 75
End: 2025-06-24
Payer: MEDICARE

## 2025-06-24 ENCOUNTER — TELEPHONE (OUTPATIENT)
Dept: CARDIOLOGY | Facility: CLINIC | Age: 75
End: 2025-06-24
Payer: MEDICARE

## 2025-06-24 DIAGNOSIS — E03.9 HYPOTHYROIDISM, UNSPECIFIED TYPE: ICD-10-CM

## 2025-06-24 LAB — TSH SERPL DL<=0.05 MIU/L-ACNC: 1.55 UIU/ML (ref 0.27–4.2)

## 2025-06-24 PROCEDURE — 36415 COLL VENOUS BLD VENIPUNCTURE: CPT

## 2025-06-24 PROCEDURE — 84443 ASSAY THYROID STIM HORMONE: CPT

## 2025-06-24 NOTE — TELEPHONE ENCOUNTER
SW patient. Started on the amlodipine 2.5 mg daily on 6/13 by 6/17 itching over entire body. Became progressively worse. Patient was on Bactrim as well but itching continued once antibiotic was complete. Patient stopped amlodipine on 6/20 and symptoms have resolved. /74.     Advised to monitor blood pressure twice a day and notify office if consistently greater than 140/90.

## 2025-06-24 NOTE — TELEPHONE ENCOUNTER
Caller: Gisell Vraghese    Relationship: Self    Best call back number: [phone]     Which medication are you concerned about: AMLODIPINE    Who prescribed you this medication: STACEY    When did you start taking this medication: 06.13.25    What are your concerns: ITCHING    PATIENT STOPPED TAKING THE MEDICATION WHICH RELIEVED THE ITCHING.

## 2025-06-24 NOTE — TELEPHONE ENCOUNTER
Agree with recommendations.  Stay off of the amlodipine and we will choose another agent if blood pressure is staying consistently greater than 140/90.

## 2025-07-03 RX ORDER — CLOBETASOL PROPIONATE 0.5 MG/G
CREAM TOPICAL
Qty: 45 G | Refills: 0 | OUTPATIENT
Start: 2025-07-03

## 2025-07-03 NOTE — TELEPHONE ENCOUNTER
Caller: Gisell Varghese    Relationship: Self    Best call back number:     [phone]     Requested Prescriptions:   Requested Prescriptions     Pending Prescriptions Disp Refills    clobetasol propionate (TEMOVATE) 0.05 % cream 45 g 0        [address] [facility] - [phone]  - [phone]      Last office visit with prescribing clinician: Visit date not found   Last telemedicine visit with prescribing clinician: Visit date not found   Next office visit with prescribing clinician: 10/7/2025     Additional details provided by patient:     Does the patient have less than a 3 day supply:  [] Yes  [] No    Would you like a call back once the refill request has been completed: [] Yes [] No    If the office needs to give you a call back, can they leave a voicemail: [] Yes [] No    Hamida Membreno Rep   07/03/25 09:29 EDT

## 2025-07-09 DIAGNOSIS — L30.9 DERMATITIS: Primary | ICD-10-CM

## 2025-07-09 RX ORDER — CLOBETASOL PROPIONATE 0.5 MG/G
CREAM TOPICAL 2 TIMES DAILY
Qty: 45 G | Refills: 1 | Status: SHIPPED | OUTPATIENT
Start: 2025-07-09

## 2025-07-24 ENCOUNTER — OFFICE VISIT (OUTPATIENT)
Dept: GASTROENTEROLOGY | Facility: CLINIC | Age: 75
End: 2025-07-24
Payer: MEDICARE

## 2025-07-24 VITALS
WEIGHT: 131 LBS | HEART RATE: 65 BPM | SYSTOLIC BLOOD PRESSURE: 171 MMHG | DIASTOLIC BLOOD PRESSURE: 76 MMHG | BODY MASS INDEX: 27.5 KG/M2 | HEIGHT: 58 IN

## 2025-07-24 DIAGNOSIS — R74.8 ELEVATED AMYLASE: Primary | ICD-10-CM

## 2025-07-24 DIAGNOSIS — K64.9 HEMORRHOIDS, UNSPECIFIED HEMORRHOID TYPE: ICD-10-CM

## 2025-07-24 DIAGNOSIS — K59.04 CHRONIC IDIOPATHIC CONSTIPATION: ICD-10-CM

## 2025-07-24 RX ORDER — WHEAT DEXTRIN 3 G/3.8 G
1-2 POWDER (GRAM) ORAL DAILY
Qty: 529 G | Refills: 2 | Status: SHIPPED | OUTPATIENT
Start: 2025-07-24

## 2025-07-24 NOTE — PROGRESS NOTES
Chief Complaint     Abdominal Pain and Elevated Hepatic Enzymes    Patient or patient representative verbalized consent for the use of Ambient Listening during the visit with  LUCAS Soto for chart documentation. 7/24/2025  13:48 EDT    History of Present Illness     History of Present Illness  The patient presents for evaluation of abdominal pain and elevated liver enzymes.    She was scheduled for this appointment in 03/2025 due to severe abdominal pain, initially suspected to be gallbladder-related. However, diagnostic tests ruled out the gallbladder as the source of the pain. Currently, she reports no abdominal pain.    She has been experiencing constipation since her teenage years. She also reports incomplete bowel movements, with undigested food visible in her stool even after three days. Without medication, she is unable to have a bowel movement. This morning, she had three bowel movements, which were not fully formed. She does not consume digestive supplements but takes omeprazole. She manages her constipation with MiraLAX and prune juice, which she finds effective. She has not tried Benefiber as an alternative to MiraLAX.    She has hemorrhoids, which cause discomfort during bowel movements. She uses Anusol cream for her hemorrhoids, which provides some relief but does not completely alleviate the swelling.    She has bleeding gums, which her dentist suggested might be due to an autoimmune disease. She was advised to consult her family doctor about this issue.    PAST SURGICAL HISTORY:  Colonoscopy with polyp removal and clip placement on 05/15/2025.    SOCIAL HISTORY  Diet: Consumes prune juice in the morning.         History      Past Medical History:   Diagnosis Date    Essential hypertension 4/1/2020    Fatty liver disease, nonalcoholic     Glaucoma     Hemorrhoid     Hyperlipidemia LDL goal <100 4/1/2020    Hypothyroidism     Ingrowing toenail     Muscle cramps     Paroxysmal atrial  fibrillation     Spinal stenosis of cervical region     Trigger middle finger of left hand 2015       Past Surgical History:   Procedure Laterality Date    BACK SURGERY Right 2009    L5-S1 laminectomy(Dr. Bains)    CATARACT EXTRACTION Bilateral 2015     SECTION  .    COLONOSCOPY      COLONOSCOPY N/A 05/15/2025    Procedure: COLONOSCOPY with polypectomy, clip x1;  Surgeon: Khalif Perez MD;  Location: [facility] END[facility];  Service: General;  Laterality: N/A;  colon polyp    HAND SURGERY      HYSTERECTOMY      UPPER GASTROINTESTINAL ENDOSCOPY         Family History   Problem Relation Age of Onset    Arthritis Mother     Osteoporosis Mother     Arthritis Father     Arthritis Sister     Arthritis Brother     Heart disease Brother         MI    Stroke Other         Current Medications        Current Outpatient Medications:     apixaban (Eliquis) 5 MG tablet tablet, MYRIAM GEOVANNA, Disp: 180 tablet, Rfl: 3    atenolol (TENORMIN) 50 MG tablet, Take 1 tablet by mouth twice daily, Disp: 180 tablet, Rfl: 1    clobetasol propionate (TEMOVATE) 0.05 % cream, Apply  topically to the appropriate area as directed 2 (Two) Times a Day., Disp: 45 g, Rfl: 1    docusate sodium (COLACE) 100 MG capsule, Take 2 capsules by mouth Daily., Disp: , Rfl:     dorzolamide-timolol (COSOPT) 2-0.5 % ophthalmic solution, INSTILL 1 DROP INTO EACH EYE EVERY 12 HOURS, Disp: , Rfl:     flecainide (TAMBOCOR) 50 MG tablet, TAKE 1 TABLET BY MOUTH EVERY 12 HOURS, Disp: 180 tablet, Rfl: 3    Hydrocortisone, Perianal, (Anusol-HC) 2.5 % rectal cream, Apply rectally 2 times daily, Disp: 28 g, Rfl: 0    latanoprost (XALATAN) 0.005 % ophthalmic solution, Administer 1 drop to the right eye Every Night., Disp: , Rfl:     levothyroxine (SYNTHROID, LEVOTHROID) 88 MCG tablet, Take 1 tablet by mouth Daily. Except omit on Sundays and 0.5 tablet on , Disp: 60 tablet, Rfl: 0    omeprazole (priLOSEC) 40 MG capsule, Take 1 capsule  "by mouth Daily., Disp: 90 capsule, Rfl: 1    rosuvastatin (CRESTOR) 5 MG tablet, Take 1 tablet by mouth once daily, Disp: 90 tablet, Rfl: 3    chlorhexidine (PERIDEX) 0.12 % solution, RINSE 15 ML IN MOUTH FOR 30 SECONDS DAILY IN THE MORNING AND EVENING AFTER TOOTBRUSHING. EXPECTORATE AFTER RINSING, DO NOT SWALLOW. (Patient not taking: Reported on 7/24/2025), Disp: , Rfl:     Magnesium 500 MG tablet, Take 1 tablet by mouth Daily., Disp: , Rfl:     Wheat Dextrin (Benefiber) powder, Take 1-2 each by mouth Daily. 1-2 tbsp daily, Disp: 529 g, Rfl: 2     Allergies     Allergies   Allergen Reactions    Amlodipine Itching    Cephalexin Hives    Ciprofloxacin Hives    Hydrocodone-Acetaminophen Mental Status Change    Hydrocodone-Ibuprofen Mental Status Change    Penicillins Hives    Adhesive Tape Rash       Social History       Social History     Social History Narrative    Not on file       Immunizations     Immunization:  Immunization History   Administered Date(s) Administered    Arexvy (RSV, Adults 60+ yrs) 12/22/2023    COVID-19 (PFIZER) 12YRS+ (COMIRNATY) 10/09/2023, 10/01/2024    COVID-19 (PFIZER) BIVALENT 12+YRS 10/06/2022    COVID-19 (PFIZER) Purple Cap Monovalent 03/06/2021, 03/06/2021, 03/27/2021, 03/27/2021, 10/04/2021, 10/04/2021    Covid-19 (Pfizer) Gray Cap Monovalent 04/01/2022    Fluad Quad 65+ 10/09/2023    Fluzone High-Dose 65+YRS 10/01/2024    Fluzone High-Dose 65+yrs 08/14/2020, 08/14/2020, 10/12/2021, 10/06/2022    Hepatitis A 04/20/2018, 04/20/2018, 10/20/2018, 10/20/2018    Influenza, Unspecified 08/14/2020    Pneumococcal Conjugate 20-Valent (PCV20) 04/19/2023    Shingrix 04/18/2022, 06/21/2022    Zoster, Unspecified 04/18/2022          Objective     Objective     Vital Signs:   /76 (BP Location: Left arm, Patient Position: Sitting, Cuff Size: Adult)   Pulse 65   Ht 147.3 cm (57.99\")   Wt 59.4 kg (131 lb)   BMI 27.39 kg/m²       Physical Exam  Constitutional:       General: She is not in " acute distress.     Appearance: Normal appearance. She is well-developed and normal weight.   HENT:      Head: Normocephalic and atraumatic.   Eyes:      Conjunctiva/sclera: Conjunctivae normal.      Pupils: Pupils are equal, round, and reactive to light.      Visual Fields: Right eye visual fields normal and left eye visual fields normal.   Cardiovascular:      Rate and Rhythm: Normal rate.   Pulmonary:      Effort: Pulmonary effort is normal. No respiratory distress or retractions.      Breath sounds: Normal air entry.   Abdominal:      General: There is no distension.      Palpations: Abdomen is soft.      Tenderness: There is no abdominal tenderness.   Musculoskeletal:         General: Normal range of motion.      Right lower leg: No edema.      Left lower leg: No edema.   Skin:     General: Skin is warm and dry.      Findings: No lesion.   Neurological:      General: No focal deficit present.      Mental Status: She is alert and oriented to person, place, and time.   Psychiatric:         Mood and Affect: Mood and affect normal.         Behavior: Behavior normal.         Results      Result Review :   The following data was reviewed by: LUCAS Soto on 07/24/2025:    CBC w/diff          11/22/2024    11:41 3/19/2025    09:48   CBC w/Diff   WBC 10.32  7.68    RBC 4.28  4.09    Hemoglobin 13.7  12.5    Hematocrit 41.1  38.9    MCV 96.0  95.1    MCH 32.0  30.6    MCHC 33.3  32.1    RDW 13.2  12.4    Platelets 162  158    Neutrophil Rel % 63.2     Immature Granulocyte Rel % 0.5     Lymphocyte Rel % 26.0     Monocyte Rel % 8.4     Eosinophil Rel % 1.7     Basophil Rel % 0.2       CMP          11/22/2024    11:41 3/19/2025    09:48   CMP   Glucose 145  101    BUN 16  15    Creatinine 1.07  1.16    EGFR 55.3  50.2    Sodium 134  138    Potassium 4.0  4.1    Chloride 98  103    Calcium 10.0  10.2    Total Protein 8.0  7.8    Albumin 4.2  4.1    Globulin 3.8  3.7    Total Bilirubin 0.5  0.4    Alkaline  Phosphatase 100  105    AST (SGOT) 58  23    ALT (SGPT) 33  15    Albumin/Globulin Ratio 1.1  1.1    BUN/Creatinine Ratio 15.0  12.9    Anion Gap 13.1  12.1        Lipase   Lipase   Date Value Ref Range Status   03/19/2025 17 13 - 60 U/L Final     Amylase   Amylase   Date Value Ref Range Status   03/19/2025 170 (H) 28 - 100 U/L Final       Results  Labs   - Liver enzymes: 03/2025, Returned to normal   - Amylase: Elevated    Imaging   - Right upper quadrant ultrasound: 03/20/2025, Liver is normal in echotexture and size. No focal mass. No intrahepatic bile duct dilation. Normal gallbladder.   - X-ray abdomen: 05/15/2025, Air filled colon with distention of the ascending colon and cecum measuring up to 8.4 cm. Hemoclip projecting over the ascending colon near the hepatic flexure.    Diagnostic Testing   - Colonoscopy: 05/15/2025, 4 mm polyp in the ascending colon which was removed and clip was placed. Otherwise normal colonoscopy. Pathology tubular adenoma.           Assessment and Plan        Assessment and Plan    Diagnoses and all orders for this visit:    1. Elevated amylase (Primary)  -     Lipase  -     Amylase    2. Hemorrhoids, unspecified hemorrhoid type    3. Chronic idiopathic constipation  -     Wheat Dextrin (Benefiber) powder; Take 1-2 each by mouth Daily. 1-2 tbsp daily  Dispense: 529 g; Refill: 2  -     Celiac Panel Reflex To Titer; Future        Assessment & Plan  1. Abdominal pain:  - Repeat blood work to reassess previously elevated levels.  - Consider CT scan of the pancreas if amylase level remains abnormal.    2. Constipation:  - Recommend Benefiber as an alternative to MiraLAX to bulk up stool and reduce bowel movement frequency.  - Start with 1 to 2 tablespoons of Benefiber daily, mixed with prune juice, and adjust dosage as needed.  - Conduct blood test to rule out gluten allergy.  - Contact office if no improvement with Benefiber in a couple of weeks to discuss other options.    3.  Hemorrhoids:  - Continue using Anusol cream for itching and swelling.  - Benefiber recommended to reduce straining during bowel movements.    4. Bleeding gums:  - Follow up with family doctor for further evaluation and management.            Follow Up        Follow Up   Return in about 6 months (around 1/24/2026) for constipation.  Patient was given instructions and counseling regarding her condition or for health maintenance advice. Please see specific information pulled into the AVS if appropriate.

## 2025-07-25 ENCOUNTER — PATIENT ROUNDING (BHMG ONLY) (OUTPATIENT)
Dept: GASTROENTEROLOGY | Facility: CLINIC | Age: 75
End: 2025-07-25
Payer: MEDICARE

## 2025-08-01 ENCOUNTER — LAB (OUTPATIENT)
Dept: LAB | Facility: HOSPITAL | Age: 75
End: 2025-08-01
Payer: MEDICARE

## 2025-08-01 DIAGNOSIS — K59.04 CHRONIC IDIOPATHIC CONSTIPATION: ICD-10-CM

## 2025-08-01 LAB
AMYLASE SERPL-CCNC: 171 U/L (ref 28–100)
LIPASE SERPL-CCNC: 13 U/L (ref 13–60)

## 2025-08-01 PROCEDURE — 86258 DGP ANTIBODY EACH IG CLASS: CPT

## 2025-08-01 PROCEDURE — 86364 TISS TRNSGLTMNASE EA IG CLAS: CPT

## 2025-08-01 PROCEDURE — 82150 ASSAY OF AMYLASE: CPT | Performed by: NURSE PRACTITIONER

## 2025-08-01 PROCEDURE — 36415 COLL VENOUS BLD VENIPUNCTURE: CPT

## 2025-08-01 PROCEDURE — 82784 ASSAY IGA/IGD/IGG/IGM EACH: CPT

## 2025-08-01 PROCEDURE — 83690 ASSAY OF LIPASE: CPT | Performed by: NURSE PRACTITIONER

## 2025-08-02 LAB
GLIADIN PEPTIDE IGA SER-ACNC: 5 UNITS (ref 0–19)
IGA SERPL-MCNC: 540 MG/DL (ref 64–422)
TTG IGA SER-ACNC: <2 U/ML (ref 0–3)

## 2025-08-04 ENCOUNTER — RESULTS FOLLOW-UP (OUTPATIENT)
Dept: GASTROENTEROLOGY | Facility: CLINIC | Age: 75
End: 2025-08-04
Payer: MEDICARE

## 2025-08-04 DIAGNOSIS — R74.8 ELEVATED AMYLASE: Primary | ICD-10-CM

## 2025-08-08 ENCOUNTER — LAB (OUTPATIENT)
Dept: LAB | Facility: HOSPITAL | Age: 75
End: 2025-08-08
Payer: MEDICARE

## 2025-08-08 DIAGNOSIS — R74.8 ELEVATED AMYLASE: ICD-10-CM

## 2025-08-08 LAB
BASOPHILS # BLD AUTO: 0.02 10*3/MM3 (ref 0–0.2)
BASOPHILS NFR BLD AUTO: 0.3 % (ref 0–1.5)
DEPRECATED RDW RBC AUTO: 46.8 FL (ref 37–54)
EOSINOPHIL # BLD AUTO: 0.24 10*3/MM3 (ref 0–0.4)
EOSINOPHIL NFR BLD AUTO: 3.9 % (ref 0.3–6.2)
ERYTHROCYTE [DISTWIDTH] IN BLOOD BY AUTOMATED COUNT: 13.4 % (ref 12.3–15.4)
HCT VFR BLD AUTO: 39.2 % (ref 34–46.6)
HGB BLD-MCNC: 13.2 G/DL (ref 12–15.9)
HIV 1+2 AB+HIV1 P24 AG SERPL QL IA: NORMAL
IMM GRANULOCYTES # BLD AUTO: 0.02 10*3/MM3 (ref 0–0.05)
IMM GRANULOCYTES NFR BLD AUTO: 0.3 % (ref 0–0.5)
LYMPHOCYTES # BLD AUTO: 2.3 10*3/MM3 (ref 0.7–3.1)
LYMPHOCYTES NFR BLD AUTO: 37.5 % (ref 19.6–45.3)
MCH RBC QN AUTO: 32.1 PG (ref 26.6–33)
MCHC RBC AUTO-ENTMCNC: 33.7 G/DL (ref 31.5–35.7)
MCV RBC AUTO: 95.4 FL (ref 79–97)
MONOCYTES # BLD AUTO: 0.9 10*3/MM3 (ref 0.1–0.9)
MONOCYTES NFR BLD AUTO: 14.7 % (ref 5–12)
NEUTROPHILS NFR BLD AUTO: 2.65 10*3/MM3 (ref 1.7–7)
NEUTROPHILS NFR BLD AUTO: 43.3 % (ref 42.7–76)
NRBC BLD AUTO-RTO: 0 /100 WBC (ref 0–0.2)
PLATELET # BLD AUTO: 176 10*3/MM3 (ref 140–450)
PMV BLD AUTO: 12 FL (ref 6–12)
RBC # BLD AUTO: 4.11 10*6/MM3 (ref 3.77–5.28)
WBC NRBC COR # BLD AUTO: 6.13 10*3/MM3 (ref 3.4–10.8)

## 2025-08-08 PROCEDURE — 82784 ASSAY IGA/IGD/IGG/IGM EACH: CPT

## 2025-08-08 PROCEDURE — G0432 EIA HIV-1/HIV-2 SCREEN: HCPCS | Performed by: NURSE PRACTITIONER

## 2025-08-08 PROCEDURE — 86038 ANTINUCLEAR ANTIBODIES: CPT

## 2025-08-08 PROCEDURE — 85025 COMPLETE CBC W/AUTO DIFF WBC: CPT | Performed by: NURSE PRACTITIONER

## 2025-08-08 PROCEDURE — 36415 COLL VENOUS BLD VENIPUNCTURE: CPT

## 2025-08-08 PROCEDURE — 86334 IMMUNOFIX E-PHORESIS SERUM: CPT

## 2025-08-11 ENCOUNTER — RESULTS FOLLOW-UP (OUTPATIENT)
Dept: GASTROENTEROLOGY | Facility: CLINIC | Age: 75
End: 2025-08-11
Payer: MEDICARE

## 2025-08-11 LAB — ANA SER QL: NEGATIVE

## 2025-08-12 LAB
IGA SERPL-MCNC: 556 MG/DL (ref 64–422)
IGG SERPL-MCNC: 1622 MG/DL (ref 586–1602)
IGM SERPL-MCNC: 26 MG/DL (ref 26–217)
PROT PATTERN SERPL IFE-IMP: ABNORMAL

## 2025-08-14 ENCOUNTER — TELEPHONE (OUTPATIENT)
Dept: GASTROENTEROLOGY | Facility: CLINIC | Age: 75
End: 2025-08-14
Payer: MEDICARE

## 2025-08-27 ENCOUNTER — OFFICE VISIT (OUTPATIENT)
Dept: FAMILY MEDICINE CLINIC | Facility: CLINIC | Age: 75
End: 2025-08-27
Payer: MEDICARE

## 2025-08-27 VITALS
DIASTOLIC BLOOD PRESSURE: 90 MMHG | HEART RATE: 62 BPM | WEIGHT: 131.8 LBS | TEMPERATURE: 97.5 F | SYSTOLIC BLOOD PRESSURE: 166 MMHG | OXYGEN SATURATION: 99 % | HEIGHT: 58 IN | BODY MASS INDEX: 27.66 KG/M2

## 2025-08-27 DIAGNOSIS — K06.8 GUMS, BLEEDING: Primary | ICD-10-CM

## 2025-08-27 DIAGNOSIS — I10 ESSENTIAL HYPERTENSION: ICD-10-CM

## 2025-08-27 DIAGNOSIS — R74.8 ELEVATED AMYLASE: ICD-10-CM

## 2025-08-27 DIAGNOSIS — Z79.01 ANTICOAGULATED ON ELIQUIS: ICD-10-CM

## 2025-08-27 DIAGNOSIS — Z00.00 ANNUAL PHYSICAL EXAM: ICD-10-CM

## 2025-08-27 RX ORDER — LOSARTAN POTASSIUM 25 MG/1
12.5 TABLET ORAL DAILY
Qty: 60 TABLET | Refills: 0 | Status: SHIPPED | OUTPATIENT
Start: 2025-08-27

## 2025-08-27 RX ORDER — LOSARTAN POTASSIUM 25 MG/1
25 TABLET ORAL DAILY
Qty: 60 TABLET | Refills: 1 | Status: SHIPPED | OUTPATIENT
Start: 2025-08-27 | End: 2025-08-27

## 2025-08-29 ENCOUNTER — LAB (OUTPATIENT)
Dept: LAB | Facility: HOSPITAL | Age: 75
End: 2025-08-29
Payer: MEDICARE

## 2025-08-29 DIAGNOSIS — Z00.00 ANNUAL PHYSICAL EXAM: ICD-10-CM

## 2025-08-29 DIAGNOSIS — R74.8 ELEVATED AMYLASE: ICD-10-CM

## 2025-08-29 DIAGNOSIS — Z79.01 ANTICOAGULATED ON ELIQUIS: ICD-10-CM

## 2025-08-29 DIAGNOSIS — K06.8 GUMS, BLEEDING: ICD-10-CM

## 2025-08-29 LAB
ALBUMIN SERPL-MCNC: 3.8 G/DL (ref 3.5–5.2)
ALBUMIN/GLOB SERPL: 1 G/DL
ALP SERPL-CCNC: 109 U/L (ref 39–117)
ALT SERPL W P-5'-P-CCNC: 25 U/L (ref 1–33)
AMYLASE SERPL-CCNC: 100 U/L (ref 28–100)
ANION GAP SERPL CALCULATED.3IONS-SCNC: 10.5 MMOL/L (ref 5–15)
APTT PPP: 32.5 SECONDS (ref 24.2–34.2)
AST SERPL-CCNC: 33 U/L (ref 1–32)
BACTERIA UR QL AUTO: NORMAL /HPF
BASOPHILS # BLD AUTO: 0.01 10*3/MM3 (ref 0–0.2)
BASOPHILS NFR BLD AUTO: 0.2 % (ref 0–1.5)
BILIRUB SERPL-MCNC: 0.5 MG/DL (ref 0–1.2)
BILIRUB UR QL STRIP: NEGATIVE
BUN SERPL-MCNC: 12 MG/DL (ref 8–23)
BUN/CREAT SERPL: 11.1 (ref 7–25)
CALCIUM SPEC-SCNC: 9.9 MG/DL (ref 8.6–10.5)
CHLORIDE SERPL-SCNC: 101 MMOL/L (ref 98–107)
CHOLEST SERPL-MCNC: 155 MG/DL (ref 0–200)
CHROMATIN AB SERPL-ACNC: <10 IU/ML (ref 0–14)
CLARITY UR: CLEAR
CO2 SERPL-SCNC: 21.5 MMOL/L (ref 22–29)
COLOR UR: YELLOW
CREAT SERPL-MCNC: 1.08 MG/DL (ref 0.57–1)
CRP SERPL-MCNC: 0.98 MG/DL (ref 0–0.5)
DEPRECATED RDW RBC AUTO: 46.2 FL (ref 37–54)
EGFRCR SERPLBLD CKD-EPI 2021: 54.7 ML/MIN/1.73
EOSINOPHIL # BLD AUTO: 0.27 10*3/MM3 (ref 0–0.4)
EOSINOPHIL NFR BLD AUTO: 4.1 % (ref 0.3–6.2)
ERYTHROCYTE [DISTWIDTH] IN BLOOD BY AUTOMATED COUNT: 13.2 % (ref 12.3–15.4)
ERYTHROCYTE [SEDIMENTATION RATE] IN BLOOD: 39 MM/HR (ref 0–30)
GLOBULIN UR ELPH-MCNC: 4 GM/DL
GLUCOSE SERPL-MCNC: 99 MG/DL (ref 65–99)
GLUCOSE UR STRIP-MCNC: NEGATIVE MG/DL
HBA1C MFR BLD: 6 % (ref 4.8–5.6)
HCT VFR BLD AUTO: 37.6 % (ref 34–46.6)
HDLC SERPL-MCNC: 49 MG/DL (ref 40–60)
HGB BLD-MCNC: 12.5 G/DL (ref 12–15.9)
HGB UR QL STRIP.AUTO: NEGATIVE
HYALINE CASTS UR QL AUTO: NORMAL /LPF
IMM GRANULOCYTES # BLD AUTO: 0.03 10*3/MM3 (ref 0–0.05)
IMM GRANULOCYTES NFR BLD AUTO: 0.5 % (ref 0–0.5)
INR PPP: 1.26 (ref 0.86–1.15)
KETONES UR QL STRIP: NEGATIVE
LDLC SERPL CALC-MCNC: 81 MG/DL (ref 0–100)
LDLC/HDLC SERPL: 1.59 {RATIO}
LEUKOCYTE ESTERASE UR QL STRIP.AUTO: ABNORMAL
LYMPHOCYTES # BLD AUTO: 2.45 10*3/MM3 (ref 0.7–3.1)
LYMPHOCYTES NFR BLD AUTO: 36.8 % (ref 19.6–45.3)
MCH RBC QN AUTO: 31.7 PG (ref 26.6–33)
MCHC RBC AUTO-ENTMCNC: 33.2 G/DL (ref 31.5–35.7)
MCV RBC AUTO: 95.4 FL (ref 79–97)
MONOCYTES # BLD AUTO: 1.02 10*3/MM3 (ref 0.1–0.9)
MONOCYTES NFR BLD AUTO: 15.3 % (ref 5–12)
NEUTROPHILS NFR BLD AUTO: 2.88 10*3/MM3 (ref 1.7–7)
NEUTROPHILS NFR BLD AUTO: 43.1 % (ref 42.7–76)
NITRITE UR QL STRIP: NEGATIVE
NRBC BLD AUTO-RTO: 0 /100 WBC (ref 0–0.2)
PH UR STRIP.AUTO: 8 [PH] (ref 5–8)
PLATELET # BLD AUTO: 176 10*3/MM3 (ref 140–450)
PMV BLD AUTO: 11.9 FL (ref 6–12)
POTASSIUM SERPL-SCNC: 4.2 MMOL/L (ref 3.5–5.2)
PROT SERPL-MCNC: 7.8 G/DL (ref 6–8.5)
PROT UR QL STRIP: ABNORMAL
PROTHROMBIN TIME: 16.3 SECONDS (ref 11.8–14.9)
RBC # BLD AUTO: 3.94 10*6/MM3 (ref 3.77–5.28)
RBC # UR STRIP: NORMAL /HPF
REF LAB TEST METHOD: NORMAL
SODIUM SERPL-SCNC: 133 MMOL/L (ref 136–145)
SP GR UR STRIP: 1.01 (ref 1–1.03)
SQUAMOUS #/AREA URNS HPF: NORMAL /HPF
TRIGL SERPL-MCNC: 141 MG/DL (ref 0–150)
TSH SERPL DL<=0.05 MIU/L-ACNC: 2.97 UIU/ML (ref 0.27–4.2)
UROBILINOGEN UR QL STRIP: ABNORMAL
VLDLC SERPL-MCNC: 25 MG/DL (ref 5–40)
WBC # UR STRIP: NORMAL /HPF
WBC NRBC COR # BLD AUTO: 6.66 10*3/MM3 (ref 3.4–10.8)

## 2025-08-29 PROCEDURE — 85610 PROTHROMBIN TIME: CPT

## 2025-08-29 PROCEDURE — 83036 HEMOGLOBIN GLYCOSYLATED A1C: CPT

## 2025-08-29 PROCEDURE — 84443 ASSAY THYROID STIM HORMONE: CPT | Performed by: STUDENT IN AN ORGANIZED HEALTH CARE EDUCATION/TRAINING PROGRAM

## 2025-08-29 PROCEDURE — 85025 COMPLETE CBC W/AUTO DIFF WBC: CPT

## 2025-08-29 PROCEDURE — 82150 ASSAY OF AMYLASE: CPT

## 2025-08-29 PROCEDURE — 80061 LIPID PANEL: CPT | Performed by: STUDENT IN AN ORGANIZED HEALTH CARE EDUCATION/TRAINING PROGRAM

## 2025-08-29 PROCEDURE — 85730 THROMBOPLASTIN TIME PARTIAL: CPT

## 2025-08-29 PROCEDURE — 86431 RHEUMATOID FACTOR QUANT: CPT

## 2025-08-29 PROCEDURE — 86140 C-REACTIVE PROTEIN: CPT

## 2025-08-29 PROCEDURE — 85652 RBC SED RATE AUTOMATED: CPT

## 2025-08-29 PROCEDURE — 36415 COLL VENOUS BLD VENIPUNCTURE: CPT

## 2025-08-29 PROCEDURE — 80053 COMPREHEN METABOLIC PANEL: CPT | Performed by: STUDENT IN AN ORGANIZED HEALTH CARE EDUCATION/TRAINING PROGRAM

## 2025-08-29 PROCEDURE — 81001 URINALYSIS AUTO W/SCOPE: CPT | Performed by: STUDENT IN AN ORGANIZED HEALTH CARE EDUCATION/TRAINING PROGRAM

## (undated) DEVICE — THE STERILE LIGHT HANDLE COVER IS USED WITH STERIS SURGICAL LIGHTING AND VISUALIZATION SYSTEMS.

## (undated) DEVICE — SOL IRRG H2O PL/BG 1000ML STRL

## (undated) DEVICE — STERILE POLYISOPRENE POWDER-FREE SURGICAL GLOVES: Brand: PROTEXIS

## (undated) DEVICE — SOLIDIFIER LIQLOC PLS 1500CC BT

## (undated) DEVICE — Device

## (undated) DEVICE — DEFENDO AIR WATER SUCTION AND BIOPSY VALVE KIT FOR  OLYMPUS: Brand: DEFENDO AIR/WATER/SUCTION AND BIOPSY VALVE

## (undated) DEVICE — SOL IRR H2O BO 1000ML STRL

## (undated) DEVICE — TUBING, SUCTION, 1/4" X 10', STRAIGHT: Brand: MEDLINE

## (undated) DEVICE — CONN JET HYDRA H20 AUXILIARY DISP

## (undated) DEVICE — STERILE POLYISOPRENE POWDER-FREE SURGICAL GLOVES WITH EMOLLIENT COATING: Brand: PROTEXIS

## (undated) DEVICE — LINER SURG CANSTR SXN S/RIGD 1500CC

## (undated) DEVICE — SINGLE-USE BIOPSY FORCEPS: Brand: RADIAL JAW 4